# Patient Record
Sex: MALE | Race: WHITE | Employment: FULL TIME | URBAN - NONMETROPOLITAN AREA
[De-identification: names, ages, dates, MRNs, and addresses within clinical notes are randomized per-mention and may not be internally consistent; named-entity substitution may affect disease eponyms.]

---

## 2017-08-01 ENCOUNTER — APPOINTMENT (OUTPATIENT)
Dept: GENERAL RADIOLOGY | Age: 57
DRG: 808 | End: 2017-08-01
Payer: COMMERCIAL

## 2017-08-01 ENCOUNTER — HOSPITAL ENCOUNTER (INPATIENT)
Age: 57
LOS: 3 days | Discharge: HOME | DRG: 808 | End: 2017-08-04
Attending: EMERGENCY MEDICINE | Admitting: INTERNAL MEDICINE
Payer: COMMERCIAL

## 2017-08-01 DIAGNOSIS — E87.1 HYPONATREMIA: ICD-10-CM

## 2017-08-01 DIAGNOSIS — D69.6 THROMBOCYTOPENIA (HCC): ICD-10-CM

## 2017-08-01 DIAGNOSIS — D61.818 PANCYTOPENIA (HCC): Primary | ICD-10-CM

## 2017-08-01 LAB
ABO: NORMAL
ALBUMIN SERPL-MCNC: 2.5 G/DL (ref 3.5–5.1)
ALP BLD-CCNC: 195 U/L (ref 38–126)
ALT SERPL-CCNC: 50 U/L (ref 11–66)
ANION GAP SERPL CALCULATED.3IONS-SCNC: 15 MEQ/L (ref 8–16)
ANTIBODY SCREEN: NORMAL
AST SERPL-CCNC: 114 U/L (ref 5–40)
BILIRUB SERPL-MCNC: 1 MG/DL (ref 0.3–1.2)
BUN BLDV-MCNC: 21 MG/DL (ref 7–22)
CALCIUM SERPL-MCNC: 8 MG/DL (ref 8.5–10.5)
CHLORIDE BLD-SCNC: 94 MEQ/L (ref 98–111)
CO2: 19 MEQ/L (ref 23–33)
CREAT SERPL-MCNC: 0.9 MG/DL (ref 0.4–1.2)
GFR SERPL CREATININE-BSD FRML MDRD: 86 ML/MIN/1.73M2
GLUCOSE BLD-MCNC: 130 MG/DL (ref 70–108)
INR BLD: 1.15 (ref 0.85–1.13)
OSMOLALITY CALCULATION: 261.8 MOSMOL/KG (ref 275–300)
POTASSIUM SERPL-SCNC: 3.7 MEQ/L (ref 3.5–5.2)
RH FACTOR: NORMAL
SCAN OF BLOOD SMEAR: NORMAL
SODIUM BLD-SCNC: 128 MEQ/L (ref 135–145)
TOTAL PROTEIN: 8.4 G/DL (ref 6.1–8)

## 2017-08-01 PROCEDURE — 80053 COMPREHEN METABOLIC PANEL: CPT

## 2017-08-01 PROCEDURE — P9035 PLATELET PHERES LEUKOREDUCED: HCPCS

## 2017-08-01 PROCEDURE — 2580000003 HC RX 258: Performed by: EMERGENCY MEDICINE

## 2017-08-01 PROCEDURE — 1200000000 HC SEMI PRIVATE

## 2017-08-01 PROCEDURE — 85025 COMPLETE CBC W/AUTO DIFF WBC: CPT

## 2017-08-01 PROCEDURE — 36415 COLL VENOUS BLD VENIPUNCTURE: CPT

## 2017-08-01 PROCEDURE — 87040 BLOOD CULTURE FOR BACTERIA: CPT

## 2017-08-01 PROCEDURE — 85610 PROTHROMBIN TIME: CPT

## 2017-08-01 PROCEDURE — 86850 RBC ANTIBODY SCREEN: CPT

## 2017-08-01 PROCEDURE — 86901 BLOOD TYPING SEROLOGIC RH(D): CPT

## 2017-08-01 PROCEDURE — 36430 TRANSFUSION BLD/BLD COMPNT: CPT

## 2017-08-01 PROCEDURE — 99284 EMERGENCY DEPT VISIT MOD MDM: CPT

## 2017-08-01 PROCEDURE — 86900 BLOOD TYPING SEROLOGIC ABO: CPT

## 2017-08-01 PROCEDURE — 2580000003 HC RX 258: Performed by: INTERNAL MEDICINE

## 2017-08-01 PROCEDURE — 71010 XR CHEST PORTABLE: CPT

## 2017-08-01 PROCEDURE — 99222 1ST HOSP IP/OBS MODERATE 55: CPT | Performed by: INTERNAL MEDICINE

## 2017-08-01 RX ORDER — POTASSIUM CHLORIDE 20MEQ/15ML
40 LIQUID (ML) ORAL PRN
Status: DISCONTINUED | OUTPATIENT
Start: 2017-08-01 | End: 2017-08-04 | Stop reason: HOSPADM

## 2017-08-01 RX ORDER — ACETAMINOPHEN 325 MG/1
650 TABLET ORAL EVERY 4 HOURS PRN
Status: DISCONTINUED | OUTPATIENT
Start: 2017-08-01 | End: 2017-08-04 | Stop reason: HOSPADM

## 2017-08-01 RX ORDER — SODIUM CHLORIDE 0.9 % (FLUSH) 0.9 %
10 SYRINGE (ML) INJECTION EVERY 12 HOURS SCHEDULED
Status: DISCONTINUED | OUTPATIENT
Start: 2017-08-01 | End: 2017-08-04 | Stop reason: HOSPADM

## 2017-08-01 RX ORDER — 0.9 % SODIUM CHLORIDE 0.9 %
250 INTRAVENOUS SOLUTION INTRAVENOUS ONCE
Status: COMPLETED | OUTPATIENT
Start: 2017-08-01 | End: 2017-08-02

## 2017-08-01 RX ORDER — POTASSIUM CHLORIDE 20 MEQ/1
40 TABLET, EXTENDED RELEASE ORAL PRN
Status: DISCONTINUED | OUTPATIENT
Start: 2017-08-01 | End: 2017-08-04 | Stop reason: HOSPADM

## 2017-08-01 RX ORDER — SODIUM CHLORIDE 0.9 % (FLUSH) 0.9 %
10 SYRINGE (ML) INJECTION PRN
Status: DISCONTINUED | OUTPATIENT
Start: 2017-08-01 | End: 2017-08-04 | Stop reason: HOSPADM

## 2017-08-01 RX ORDER — POTASSIUM CHLORIDE 7.45 MG/ML
10 INJECTION INTRAVENOUS PRN
Status: DISCONTINUED | OUTPATIENT
Start: 2017-08-01 | End: 2017-08-04 | Stop reason: HOSPADM

## 2017-08-01 RX ORDER — 0.9 % SODIUM CHLORIDE 0.9 %
250 INTRAVENOUS SOLUTION INTRAVENOUS ONCE
Status: COMPLETED | OUTPATIENT
Start: 2017-08-01 | End: 2017-08-01

## 2017-08-01 RX ORDER — SODIUM CHLORIDE 9 MG/ML
INJECTION, SOLUTION INTRAVENOUS CONTINUOUS
Status: ACTIVE | OUTPATIENT
Start: 2017-08-02 | End: 2017-08-02

## 2017-08-01 RX ORDER — ONDANSETRON 2 MG/ML
4 INJECTION INTRAMUSCULAR; INTRAVENOUS EVERY 6 HOURS PRN
Status: DISCONTINUED | OUTPATIENT
Start: 2017-08-01 | End: 2017-08-04 | Stop reason: HOSPADM

## 2017-08-01 RX ADMIN — SODIUM CHLORIDE 250 ML: 9 INJECTION, SOLUTION INTRAVENOUS at 22:12

## 2017-08-01 RX ADMIN — SODIUM CHLORIDE: 9 INJECTION, SOLUTION INTRAVENOUS at 23:49

## 2017-08-01 ASSESSMENT — PAIN SCALES - GENERAL: PAINLEVEL_OUTOF10: 0

## 2017-08-02 PROBLEM — D61.818 PANCYTOPENIA (HCC): Status: ACTIVE | Noted: 2017-08-02

## 2017-08-02 PROBLEM — E87.1 HYPONATREMIA: Status: ACTIVE | Noted: 2017-08-02

## 2017-08-02 LAB
ANION GAP SERPL CALCULATED.3IONS-SCNC: 10 MEQ/L (ref 8–16)
ANISOCYTOSIS: ABNORMAL
ANTIBODY SCREEN: NORMAL
BASOPHILIA: SLIGHT
BASOPHILS # BLD: 1.5 %
BASOPHILS ABSOLUTE: 0 THOU/MM3 (ref 0–0.1)
BILIRUB SERPL-MCNC: 0.9 MG/DL (ref 0.3–1.2)
BILIRUBIN DIRECT: 0.4 MG/DL (ref 0–0.3)
BUN BLDV-MCNC: 18 MG/DL (ref 7–22)
CALCIUM SERPL-MCNC: 7.7 MG/DL (ref 8.5–10.5)
CHLORIDE BLD-SCNC: 98 MEQ/L (ref 98–111)
CO2: 21 MEQ/L (ref 23–33)
CREAT SERPL-MCNC: 0.9 MG/DL (ref 0.4–1.2)
DIRECT COOMBS: NORMAL
EOSINOPHIL # BLD: 3.9 %
EOSINOPHILS ABSOLUTE: 0.1 THOU/MM3 (ref 0–0.4)
GFR SERPL CREATININE-BSD FRML MDRD: 86 ML/MIN/1.73M2
GLUCOSE BLD-MCNC: 85 MG/DL (ref 70–108)
HCT VFR BLD CALC: 26 % (ref 42–52)
HCT VFR BLD CALC: 27.5 % (ref 42–52)
HEMOGLOBIN: 9 GM/DL (ref 14–18)
HEMOGLOBIN: 9.4 GM/DL (ref 14–18)
LD: 325 U/L (ref 100–190)
LYMPHOCYTES # BLD: 56.7 %
LYMPHOCYTES ABSOLUTE: 1 THOU/MM3 (ref 1–4.8)
MCH RBC QN AUTO: 30.4 PG (ref 27–31)
MCH RBC QN AUTO: 30.6 PG (ref 27–31)
MCHC RBC AUTO-ENTMCNC: 34 GM/DL (ref 33–37)
MCHC RBC AUTO-ENTMCNC: 34.5 GM/DL (ref 33–37)
MCV RBC AUTO: 88.8 FL (ref 80–94)
MCV RBC AUTO: 89.2 FL (ref 80–94)
MONOCYTES # BLD: 11.7 %
MONOCYTES ABSOLUTE: 0.2 THOU/MM3 (ref 0.4–1.3)
NUCLEATED RED BLOOD CELLS: 0 /100 WBC
PATHOLOGIST REVIEW: ABNORMAL
PDW BLD-RTO: 18 % (ref 11.5–14.5)
PDW BLD-RTO: 18.2 % (ref 11.5–14.5)
PLATELET # BLD: 28 THOU/MM3 (ref 130–400)
PLATELET # BLD: 34 THOU/MM3 (ref 130–400)
PLATELET ESTIMATE: ABNORMAL
PMV BLD AUTO: 8.7 MCM (ref 7.4–10.4)
PMV BLD AUTO: 8.9 MCM (ref 7.4–10.4)
POTASSIUM SERPL-SCNC: 3.8 MEQ/L (ref 3.5–5.2)
RBC # BLD: 2.92 MILL/MM3 (ref 4.7–6.1)
RBC # BLD: 3.08 MILL/MM3 (ref 4.7–6.1)
RBC # BLD: NORMAL 10*6/UL
SEG NEUTROPHILS: 26.2 %
SEGMENTED NEUTROPHILS ABSOLUTE COUNT: 0.4 THOU/MM3 (ref 1.8–7.7)
SODIUM BLD-SCNC: 129 MEQ/L (ref 135–145)
WBC # BLD: 1.7 THOU/MM3 (ref 4.8–10.8)
WBC # BLD: 1.8 THOU/MM3 (ref 4.8–10.8)

## 2017-08-02 PROCEDURE — 80048 BASIC METABOLIC PNL TOTAL CA: CPT

## 2017-08-02 PROCEDURE — 86850 RBC ANTIBODY SCREEN: CPT

## 2017-08-02 PROCEDURE — 36415 COLL VENOUS BLD VENIPUNCTURE: CPT

## 2017-08-02 PROCEDURE — 86880 COOMBS TEST DIRECT: CPT

## 2017-08-02 PROCEDURE — 82247 BILIRUBIN TOTAL: CPT

## 2017-08-02 PROCEDURE — 2580000003 HC RX 258: Performed by: INTERNAL MEDICINE

## 2017-08-02 PROCEDURE — 1200000000 HC SEMI PRIVATE

## 2017-08-02 PROCEDURE — 83615 LACTATE (LD) (LDH) ENZYME: CPT

## 2017-08-02 PROCEDURE — 99233 SBSQ HOSP IP/OBS HIGH 50: CPT | Performed by: FAMILY MEDICINE

## 2017-08-02 PROCEDURE — 85027 COMPLETE CBC AUTOMATED: CPT

## 2017-08-02 PROCEDURE — 6370000000 HC RX 637 (ALT 250 FOR IP): Performed by: INTERNAL MEDICINE

## 2017-08-02 PROCEDURE — 82248 BILIRUBIN DIRECT: CPT

## 2017-08-02 RX ORDER — SUCRALFATE ORAL 1 G/10ML
1 SUSPENSION ORAL EVERY 6 HOURS SCHEDULED
Status: DISCONTINUED | OUTPATIENT
Start: 2017-08-02 | End: 2017-08-04 | Stop reason: HOSPADM

## 2017-08-02 RX ORDER — PREDNISONE 20 MG/1
20 TABLET ORAL 3 TIMES DAILY
Status: DISCONTINUED | OUTPATIENT
Start: 2017-08-02 | End: 2017-08-04 | Stop reason: HOSPADM

## 2017-08-02 RX ADMIN — SODIUM CHLORIDE: 9 INJECTION, SOLUTION INTRAVENOUS at 13:01

## 2017-08-02 RX ADMIN — PREDNISONE 20 MG: 20 TABLET ORAL at 19:04

## 2017-08-02 ASSESSMENT — PAIN SCALES - GENERAL
PAINLEVEL_OUTOF10: 0

## 2017-08-03 ENCOUNTER — APPOINTMENT (OUTPATIENT)
Dept: ULTRASOUND IMAGING | Age: 57
DRG: 808 | End: 2017-08-03
Payer: COMMERCIAL

## 2017-08-03 ENCOUNTER — APPOINTMENT (OUTPATIENT)
Dept: CT IMAGING | Age: 57
DRG: 808 | End: 2017-08-03
Payer: COMMERCIAL

## 2017-08-03 LAB
ALBUMIN SERPL-MCNC: 2.4 G/DL (ref 3.5–5.1)
ALP BLD-CCNC: 207 U/L (ref 38–126)
ALT SERPL-CCNC: 48 U/L (ref 11–66)
ANION GAP SERPL CALCULATED.3IONS-SCNC: 11 MEQ/L (ref 8–16)
ANISOCYTOSIS: ABNORMAL
AST SERPL-CCNC: 115 U/L (ref 5–40)
BASOPHILIA: SLIGHT
BASOPHILS # BLD: 0.4 %
BASOPHILS ABSOLUTE: 0 THOU/MM3 (ref 0–0.1)
BILIRUB SERPL-MCNC: 0.8 MG/DL (ref 0.3–1.2)
BUN BLDV-MCNC: 16 MG/DL (ref 7–22)
CALCIUM IONIZED: 1.15 MMOL/L (ref 1.12–1.32)
CALCIUM SERPL-MCNC: 8 MG/DL (ref 8.5–10.5)
CHLORIDE BLD-SCNC: 100 MEQ/L (ref 98–111)
CO2: 21 MEQ/L (ref 23–33)
CREAT SERPL-MCNC: 0.7 MG/DL (ref 0.4–1.2)
EOSINOPHIL # BLD: 0.8 %
EOSINOPHILS ABSOLUTE: 0 THOU/MM3 (ref 0–0.4)
GFR SERPL CREATININE-BSD FRML MDRD: > 90 ML/MIN/1.73M2
GLUCOSE BLD-MCNC: 242 MG/DL (ref 70–108)
HCT VFR BLD CALC: 24.9 % (ref 42–52)
HEMOGLOBIN: 8.8 GM/DL (ref 14–18)
LYMPHOCYTES # BLD: 51.8 %
LYMPHOCYTES ABSOLUTE: 0.5 THOU/MM3 (ref 1–4.8)
MAGNESIUM: 2 MG/DL (ref 1.6–2.4)
MCH RBC QN AUTO: 31.1 PG (ref 27–31)
MCHC RBC AUTO-ENTMCNC: 35.3 GM/DL (ref 33–37)
MCV RBC AUTO: 88.3 FL (ref 80–94)
MONOCYTES # BLD: 7.9 %
MONOCYTES ABSOLUTE: 0.1 THOU/MM3 (ref 0.4–1.3)
NUCLEATED RED BLOOD CELLS: 0 /100 WBC
PDW BLD-RTO: 18.4 % (ref 11.5–14.5)
PLATELET # BLD: 23 THOU/MM3 (ref 130–400)
PLATELET ESTIMATE: ABNORMAL
PMV BLD AUTO: 9 MCM (ref 7.4–10.4)
POTASSIUM SERPL-SCNC: 4.1 MEQ/L (ref 3.5–5.2)
RBC # BLD: 2.82 MILL/MM3 (ref 4.7–6.1)
RBC # BLD: ABNORMAL 10*6/UL
SCAN OF BLOOD SMEAR: NORMAL
SEG NEUTROPHILS: 39.1 %
SEGMENTED NEUTROPHILS ABSOLUTE COUNT: 0.4 THOU/MM3 (ref 1.8–7.7)
SODIUM BLD-SCNC: 132 MEQ/L (ref 135–145)
T4 FREE: 1.06 NG/DL (ref 0.93–1.76)
TOTAL PROTEIN: 8.1 G/DL (ref 6.1–8)
TSH SERPL DL<=0.05 MIU/L-ACNC: 0.4 UIU/ML (ref 0.4–4.2)
WBC # BLD: 1 THOU/MM3 (ref 4.8–10.8)

## 2017-08-03 PROCEDURE — 6360000004 HC RX CONTRAST MEDICATION: Performed by: FAMILY MEDICINE

## 2017-08-03 PROCEDURE — 6370000000 HC RX 637 (ALT 250 FOR IP): Performed by: INTERNAL MEDICINE

## 2017-08-03 PROCEDURE — 80050 GENERAL HEALTH PANEL: CPT

## 2017-08-03 PROCEDURE — 36415 COLL VENOUS BLD VENIPUNCTURE: CPT

## 2017-08-03 PROCEDURE — 1200000000 HC SEMI PRIVATE

## 2017-08-03 PROCEDURE — 88184 FLOWCYTOMETRY/ TC 1 MARKER: CPT

## 2017-08-03 PROCEDURE — 99233 SBSQ HOSP IP/OBS HIGH 50: CPT | Performed by: FAMILY MEDICINE

## 2017-08-03 PROCEDURE — 71260 CT THORAX DX C+: CPT

## 2017-08-03 PROCEDURE — 76705 ECHO EXAM OF ABDOMEN: CPT

## 2017-08-03 PROCEDURE — 84439 ASSAY OF FREE THYROXINE: CPT

## 2017-08-03 PROCEDURE — 83735 ASSAY OF MAGNESIUM: CPT

## 2017-08-03 PROCEDURE — 2580000003 HC RX 258: Performed by: INTERNAL MEDICINE

## 2017-08-03 PROCEDURE — 88185 FLOWCYTOMETRY/TC ADD-ON: CPT

## 2017-08-03 PROCEDURE — A6198 ALGINATE DRESSING > 48 SQ IN: HCPCS

## 2017-08-03 PROCEDURE — 82330 ASSAY OF CALCIUM: CPT

## 2017-08-03 RX ADMIN — Medication 10 ML: at 20:51

## 2017-08-03 RX ADMIN — SUCRALFATE 1 G: 1 SUSPENSION ORAL at 23:15

## 2017-08-03 RX ADMIN — Medication 10 ML: at 09:22

## 2017-08-03 RX ADMIN — IOPAMIDOL 85 ML: 755 INJECTION, SOLUTION INTRAVENOUS at 16:57

## 2017-08-03 RX ADMIN — PREDNISONE 20 MG: 20 TABLET ORAL at 06:04

## 2017-08-03 RX ADMIN — SUCRALFATE 1 G: 1 SUSPENSION ORAL at 11:22

## 2017-08-03 RX ADMIN — SUCRALFATE 1 G: 1 SUSPENSION ORAL at 18:17

## 2017-08-03 RX ADMIN — PREDNISONE 20 MG: 20 TABLET ORAL at 14:26

## 2017-08-03 RX ADMIN — PREDNISONE 20 MG: 20 TABLET ORAL at 23:15

## 2017-08-03 RX ADMIN — SUCRALFATE 1 G: 1 SUSPENSION ORAL at 06:04

## 2017-08-03 ASSESSMENT — PAIN SCALES - GENERAL
PAINLEVEL_OUTOF10: 0

## 2017-08-04 VITALS
BODY MASS INDEX: 22.85 KG/M2 | HEART RATE: 67 BPM | RESPIRATION RATE: 18 BRPM | WEIGHT: 163.2 LBS | HEIGHT: 71 IN | TEMPERATURE: 97.3 F | DIASTOLIC BLOOD PRESSURE: 58 MMHG | OXYGEN SATURATION: 100 % | SYSTOLIC BLOOD PRESSURE: 104 MMHG

## 2017-08-04 LAB
ALBUMIN SERPL-MCNC: 2.6 G/DL (ref 3.5–5.1)
ALP BLD-CCNC: 213 U/L (ref 38–126)
ALT SERPL-CCNC: 49 U/L (ref 11–66)
ANION GAP SERPL CALCULATED.3IONS-SCNC: 13 MEQ/L (ref 8–16)
ANISOCYTOSIS: ABNORMAL
AST SERPL-CCNC: 100 U/L (ref 5–40)
BASOPHILIA: SLIGHT
BASOPHILS # BLD: 0 %
BASOPHILS ABSOLUTE: 0 THOU/MM3 (ref 0–0.1)
BILIRUB SERPL-MCNC: 0.7 MG/DL (ref 0.3–1.2)
BUN BLDV-MCNC: 23 MG/DL (ref 7–22)
CALCIUM SERPL-MCNC: 8.2 MG/DL (ref 8.5–10.5)
CHLORIDE BLD-SCNC: 97 MEQ/L (ref 98–111)
CO2: 21 MEQ/L (ref 23–33)
CREAT SERPL-MCNC: 0.7 MG/DL (ref 0.4–1.2)
EOSINOPHIL # BLD: 0 %
EOSINOPHILS ABSOLUTE: 0 THOU/MM3 (ref 0–0.4)
GFR SERPL CREATININE-BSD FRML MDRD: > 90 ML/MIN/1.73M2
GLUCOSE BLD-MCNC: 129 MG/DL (ref 70–108)
HCT VFR BLD CALC: 27.6 % (ref 42–52)
HEMOGLOBIN: 9.1 GM/DL (ref 14–18)
LYMPHOCYTES # BLD: 47 %
LYMPHOCYTES ABSOLUTE: 0.6 THOU/MM3 (ref 1–4.8)
MCH RBC QN AUTO: 30.3 PG (ref 27–31)
MCHC RBC AUTO-ENTMCNC: 33.1 GM/DL (ref 33–37)
MCV RBC AUTO: 91.5 FL (ref 80–94)
MONOCYTES # BLD: 13 %
MONOCYTES ABSOLUTE: 0.2 THOU/MM3 (ref 0.4–1.3)
NUCLEATED RED BLOOD CELLS: 1 /100 WBC
PDW BLD-RTO: 17.8 % (ref 11.5–14.5)
PLATELET # BLD: 36 THOU/MM3 (ref 130–400)
PLATELET ESTIMATE: ABNORMAL
PMV BLD AUTO: 9.9 MCM (ref 7.4–10.4)
POTASSIUM SERPL-SCNC: 4.6 MEQ/L (ref 3.5–5.2)
RBC # BLD: 3.01 MILL/MM3 (ref 4.7–6.1)
RBC # BLD: ABNORMAL 10*6/UL
RHEUMATOID FACTOR: 462 IU/ML (ref 0–13)
ROULEAUX: SLIGHT
SCAN OF BLOOD SMEAR: NORMAL
SEG NEUTROPHILS: 40 %
SEGMENTED NEUTROPHILS ABSOLUTE COUNT: 0.5 THOU/MM3 (ref 1.8–7.7)
SODIUM BLD-SCNC: 131 MEQ/L (ref 135–145)
TOTAL PROTEIN: 9 G/DL (ref 6.1–8)
WBC # BLD: 1.3 THOU/MM3 (ref 4.8–10.8)

## 2017-08-04 PROCEDURE — 2580000003 HC RX 258: Performed by: INTERNAL MEDICINE

## 2017-08-04 PROCEDURE — 85025 COMPLETE CBC W/AUTO DIFF WBC: CPT

## 2017-08-04 PROCEDURE — 80053 COMPREHEN METABOLIC PANEL: CPT

## 2017-08-04 PROCEDURE — 6370000000 HC RX 637 (ALT 250 FOR IP): Performed by: INTERNAL MEDICINE

## 2017-08-04 PROCEDURE — 86430 RHEUMATOID FACTOR TEST QUAL: CPT

## 2017-08-04 PROCEDURE — 86038 ANTINUCLEAR ANTIBODIES: CPT

## 2017-08-04 PROCEDURE — 36415 COLL VENOUS BLD VENIPUNCTURE: CPT

## 2017-08-04 PROCEDURE — 86039 ANTINUCLEAR ANTIBODIES (ANA): CPT

## 2017-08-04 PROCEDURE — 99239 HOSP IP/OBS DSCHRG MGMT >30: CPT | Performed by: FAMILY MEDICINE

## 2017-08-04 RX ORDER — PANTOPRAZOLE SODIUM 40 MG/1
40 TABLET, DELAYED RELEASE ORAL DAILY
Qty: 30 TABLET | Refills: 1 | Status: SHIPPED | OUTPATIENT
Start: 2017-08-04

## 2017-08-04 RX ORDER — PREDNISONE 20 MG/1
20 TABLET ORAL 3 TIMES DAILY
Qty: 90 TABLET | Refills: 0 | Status: ON HOLD | OUTPATIENT
Start: 2017-08-04 | End: 2017-09-27 | Stop reason: DRUGHIGH

## 2017-08-04 RX ADMIN — Medication 10 ML: at 08:00

## 2017-08-04 RX ADMIN — SUCRALFATE 1 G: 1 SUSPENSION ORAL at 05:38

## 2017-08-04 RX ADMIN — PREDNISONE 20 MG: 20 TABLET ORAL at 05:38

## 2017-08-04 ASSESSMENT — PAIN SCALES - GENERAL
PAINLEVEL_OUTOF10: 0

## 2017-08-05 LAB — LEUK/LYMPH PHENOTYPING WB: NORMAL

## 2017-08-06 LAB — ANA SCREEN: DETECTED

## 2017-08-07 ENCOUNTER — HOSPITAL ENCOUNTER (OUTPATIENT)
Age: 57
Discharge: HOME | End: 2017-08-07
Payer: COMMERCIAL

## 2017-08-07 LAB
ANA SCREEN, REFLEXED: > 1
ANION GAP SERPL CALCULATED.3IONS-SCNC: 13 MEQ/L (ref 8–16)
ANISOCYTOSIS: ABNORMAL
BASOPHILIA: SLIGHT
BASOPHILS # BLD: 0.4 %
BASOPHILS ABSOLUTE: 0 THOU/MM3 (ref 0–0.1)
BLOOD CULTURE, ROUTINE: NORMAL
BLOOD CULTURE, ROUTINE: NORMAL
BUN BLDV-MCNC: 29 MG/DL (ref 7–22)
CALCIUM SERPL-MCNC: 8.6 MG/DL (ref 8.5–10.5)
CHLORIDE BLD-SCNC: 99 MEQ/L (ref 98–111)
CHOLESTEROL, TOTAL: 134 MG/DL (ref 100–199)
CO2: 21 MEQ/L (ref 23–33)
CREAT SERPL-MCNC: 0.7 MG/DL (ref 0.4–1.2)
EOSINOPHIL # BLD: 0.2 %
EOSINOPHILS ABSOLUTE: 0 THOU/MM3 (ref 0–0.4)
GFR SERPL CREATININE-BSD FRML MDRD: > 90 ML/MIN/1.73M2
GLUCOSE BLD-MCNC: 109 MG/DL (ref 70–108)
HCT VFR BLD CALC: 32.6 % (ref 42–52)
HDLC SERPL-MCNC: 36 MG/DL
HEMOGLOBIN: 10.9 GM/DL (ref 14–18)
LDL CHOLESTEROL CALCULATED: 77 MG/DL
LYMPHOCYTES # BLD: 36.1 %
LYMPHOCYTES ABSOLUTE: 0.8 THOU/MM3 (ref 1–4.8)
MCH RBC QN AUTO: 29.9 PG (ref 27–31)
MCHC RBC AUTO-ENTMCNC: 33.4 GM/DL (ref 33–37)
MCV RBC AUTO: 89.4 FL (ref 80–94)
MONOCYTES # BLD: 15.6 %
MONOCYTES ABSOLUTE: 0.3 THOU/MM3 (ref 0.4–1.3)
NUCLEATED RED BLOOD CELLS: 0 /100 WBC
PDW BLD-RTO: 18 % (ref 11.5–14.5)
PLATELET # BLD: 71 THOU/MM3 (ref 130–400)
PLATELET ESTIMATE: ABNORMAL
PMV BLD AUTO: 8.2 MCM (ref 7.4–10.4)
POIKILOCYTES: SLIGHT
POTASSIUM SERPL-SCNC: 4.2 MEQ/L (ref 3.5–5.2)
PROSTATE SPECIFIC ANTIGEN: 0.31 NG/ML (ref 0–1)
RBC # BLD: 3.65 MILL/MM3 (ref 4.7–6.1)
RBC # BLD: ABNORMAL 10*6/UL
ROULEAUX: SLIGHT
SCAN OF BLOOD SMEAR: NORMAL
SEG NEUTROPHILS: 47.7 %
SEGMENTED NEUTROPHILS ABSOLUTE COUNT: 1 THOU/MM3 (ref 1.8–7.7)
SODIUM BLD-SCNC: 133 MEQ/L (ref 135–145)
TRIGL SERPL-MCNC: 105 MG/DL (ref 0–199)
WBC # BLD: 2.2 THOU/MM3 (ref 4.8–10.8)

## 2017-08-07 PROCEDURE — 80048 BASIC METABOLIC PNL TOTAL CA: CPT

## 2017-08-07 PROCEDURE — 80061 LIPID PANEL: CPT

## 2017-08-07 PROCEDURE — 36415 COLL VENOUS BLD VENIPUNCTURE: CPT

## 2017-08-07 PROCEDURE — 84153 ASSAY OF PSA TOTAL: CPT

## 2017-08-07 PROCEDURE — 85025 COMPLETE CBC W/AUTO DIFF WBC: CPT

## 2017-08-24 RX ORDER — HEPARIN SODIUM (PORCINE) LOCK FLUSH IV SOLN 100 UNIT/ML 100 UNIT/ML
500 SOLUTION INTRAVENOUS PRN
Status: CANCELLED | OUTPATIENT
Start: 2017-08-25

## 2017-08-24 RX ORDER — SODIUM CHLORIDE 0.9 % (FLUSH) 0.9 %
20 SYRINGE (ML) INJECTION PRN
Status: CANCELLED | OUTPATIENT
Start: 2017-08-25

## 2017-08-24 RX ORDER — 0.9 % SODIUM CHLORIDE 0.9 %
250 INTRAVENOUS SOLUTION INTRAVENOUS ONCE
Status: CANCELLED
Start: 2017-08-25 | End: 2017-08-25

## 2017-08-24 RX ORDER — SODIUM CHLORIDE 0.9 % (FLUSH) 0.9 %
10 SYRINGE (ML) INJECTION PRN
Status: CANCELLED | OUTPATIENT
Start: 2017-08-25

## 2017-08-25 ENCOUNTER — HOSPITAL ENCOUNTER (OUTPATIENT)
Dept: INFUSION THERAPY | Age: 57
Discharge: HOME | End: 2017-08-25
Payer: COMMERCIAL

## 2017-08-25 VITALS
OXYGEN SATURATION: 100 % | HEART RATE: 87 BPM | TEMPERATURE: 97.7 F | BODY MASS INDEX: 23.1 KG/M2 | HEIGHT: 71 IN | DIASTOLIC BLOOD PRESSURE: 78 MMHG | SYSTOLIC BLOOD PRESSURE: 126 MMHG | RESPIRATION RATE: 18 BRPM | WEIGHT: 165 LBS

## 2017-08-25 PROCEDURE — 2580000003 HC RX 258: Performed by: INTERNAL MEDICINE

## 2017-08-25 PROCEDURE — 36430 TRANSFUSION BLD/BLD COMPNT: CPT

## 2017-08-25 PROCEDURE — P9035 PLATELET PHERES LEUKOREDUCED: HCPCS

## 2017-08-25 RX ORDER — 0.9 % SODIUM CHLORIDE 0.9 %
250 INTRAVENOUS SOLUTION INTRAVENOUS ONCE
Status: CANCELLED
Start: 2017-08-25 | End: 2017-08-25

## 2017-08-25 RX ORDER — SODIUM CHLORIDE 0.9 % (FLUSH) 0.9 %
20 SYRINGE (ML) INJECTION PRN
Status: CANCELLED | OUTPATIENT
Start: 2017-08-25

## 2017-08-25 RX ORDER — HEPARIN SODIUM (PORCINE) LOCK FLUSH IV SOLN 100 UNIT/ML 100 UNIT/ML
500 SOLUTION INTRAVENOUS PRN
Status: CANCELLED | OUTPATIENT
Start: 2017-08-25

## 2017-08-25 RX ORDER — SODIUM CHLORIDE 0.9 % (FLUSH) 0.9 %
10 SYRINGE (ML) INJECTION PRN
Status: CANCELLED | OUTPATIENT
Start: 2017-08-25

## 2017-08-25 RX ORDER — 0.9 % SODIUM CHLORIDE 0.9 %
250 INTRAVENOUS SOLUTION INTRAVENOUS ONCE
Status: COMPLETED | OUTPATIENT
Start: 2017-08-25 | End: 2017-08-25

## 2017-08-25 RX ADMIN — SODIUM CHLORIDE 250 ML: 9 INJECTION, SOLUTION INTRAVENOUS at 11:35

## 2017-08-25 NOTE — IP AVS SNAPSHOT
After Visit Summary  (Discharge Instructions)    Medication List for Home    Based on the information you provided to us as well as any changes during this visit, the following is your updated medication list.  Compare this with your prescription bottles at home. If you have any questions or concerns, contact your primary care physician's office. Daily Medication List (This medication list can be shared with any healthcare provider who is helping you manage your medications)      ASK your doctor about these medications if you have questions        Last Dose    Next Dose Due AM NOON PM NIGHT    pantoprazole 40 MG tablet   Commonly known as:  PROTONIX   Take 1 tablet by mouth daily                                         predniSONE 20 MG tablet   Commonly known as:  DELTASONE   Take 1 tablet by mouth 3 times daily                                                 Allergies as of 8/25/2017     No Known Allergies      Immunizations as of 8/25/2017     No immunizations on file. Last Vitals          Most Recent Value    Temp  97.6 °F (36.4 °C)    Pulse  73    Resp  18 [lungs clear]    BP  117/71         After Visit Summary    This summary was created for you. Thank you for entrusting your care to us. The following information includes details about your hospital/visit stay along with steps you should take to help with your recovery once you leave the hospital.  In this packet, you will find information about the topics listed below:    · Instructions about your medications including a list of your home medications  · A summary of your hospital visit  · Follow-up appointments once you have left the hospital  · Your care plan at home      You may receive a survey regarding the care you received during your stay. Your input is valuable to us. We encourage you to complete and return your survey in the envelope provided. We hope you will choose us in the future for your healthcare needs. Patient Information     Patient Name FATIMAH Joseph 1960      Care Provided at:     Name Address Phone       [address] [phone]            Your Visit    Here you will find information about your visit, including the reason for your visit. Please take this sheet with you when you visit your doctor or other health care provider in the future. It will help determine the best possible medical care for you at that time. If you have any questions once you leave the hospital, please call the department phone number listed below. Why you were here     Your primary diagnosis was:  Not on File      Visit Information     Date & Time Department Dept. Phone    2017 [facility]  ONCOLOGY [phone]       Follow-up Appointments    Below is a list of your follow-up and future appointments. This may not be a complete list as you may have made appointments directly with providers that we are not aware of or your providers may have made some for you. Please call your providers to confirm appointments. It is important to keep your appointments. Please bring your current insurance card, photo ID, co-pay, and all medication bottles to your appointment. If self-pay, payment is expected at the time of service. Preventive Care        Date Due    Hepatitis C screening is recommended for all adults regardless of risk factors born between [facility] at least once (lifetime) who have never been tested. 1960    HIV screening is recommended for all people regardless of risk factors  aged 15-65 years at least once (lifetime) who have never been HIV tested.  1973    Tetanus Combination Vaccine (1 - Tdap) 1977    Pneumococcal Vaccine - Pneumovax for adults aged 19-64 years with: chronic heart disease, chronic lung disease, diabetes mellitus, alcoholism, chronic liver disease, or cigarette smoking. (1 of 1 - PPSV23) 8/5/1977    Colonoscopy 8/5/2008    Yearly Flu Vaccine (1) 8/1/2017    Cholesterol Screening 8/7/2022                 Care Plan Once You Return Home    This section includes instructions you will need to follow once you leave the hospital.  Your care team will discuss these with you, so you and those caring for you know how to best care for your health needs at home. This section may also include educational information about certain health topics that may be of help to you. Discharge Instructions       Please contact your Oncologist if you have any questions regarding the platelet transfusion that you received today. Patient instructed if experience any of the symptoms following today's platelet transfusion / to notify MD immediately or go to emergency department. * dizziness/lightheadedness  *acute nausea/vomiting - not relieved with medication  *headache - not relieved from Tylenol/pain medication  *chest pain/pressure  *rash/itching  *shortness of breath        Drink fluids - 48oz fluids daily  Call if develop fever/ chills/ signs or symptoms of infection    Patient to follow up with Dr Basilia Live on Monday. Important information for a smoker       SMOKING: QUIT SMOKING. THIS IS THE MOST IMPORTANT ACTION YOU CAN TAKE TO IMPROVE YOUR CURRENT AND FUTURE HEALTH. Call the [address] [place] [address] at [place] NOW ([phone])    Smoking harms nonsmokers. When nonsmokers are around people who smoke, they absorb nicotine, carbon monoxide, and other ingredients of tobacco smoke. DO NOT SMOKE AROUND CHILDREN     Children exposed to secondhand smoke are at an increased risk of:  Sudden Infant Death Syndrome (SIDS), acute respiratory infections, inflammation of the middle ear, and severe asthma. Over a longer time, it causes heart disease and lung cancer. There is no safe level of exposure to secondhand smoke.                 [facility] Our records indicate that you have an active [employer] account. You can view your After Visit Summary by going to [email or web address] and logging in with your [employer] username and password. If you don't have a [employer] username and password but a parent or guardian has access to your record, the parent or guardian should login with their own [employer] username and password and access your record to view the After Visit Summary. Additional Information  If you have questions, please contact the physician practice where you receive care. Remember, [employer] is NOT to be used for urgent needs. For medical emergencies, dial 911. For questions regarding your [employer] account call [phone]. If you have a clinical question, please call your doctor's office. View your information online  ? Review your current list of  medications, immunization, and allergies. ? Review your future test results online . ? Review your discharge instructions provided by your caregivers at discharge    Certain functionality such as prescription refills, scheduling appointments or sending messages to your provider are not activated if your provider does not use [employer] in his/her office    For questions regarding your [employer] account call [phone]. If you have a clinical question, please call your doctor's office. The information on all pages of the After Visit Summary has been reviewed with me, the patient and/or responsible adult, by my health care provider(s). I had the opportunity to ask questions regarding this information. I understand I should dispose of my armband safely at home to protect my health information. A complete copy of the After Visit Summary has been given to me, the patient and/or responsible adult.            Patient Signature/Responsible Adult:____________________    Clinician Signature:_____________________    Date:_____________________ Time:_____________________

## 2017-08-25 NOTE — PLAN OF CARE
Problem: Intellectual/Education/Knowledge Deficit  Intervention: Verbal/written education provided  Patient educated blood product transfusion protocol:     Patient receiving 8units platelets:        - Blood product transfusion information sheet given: questions answered and consent signed  - Take vital signs/ monitor lungs sound prior to transfusion  - Monitor patient for 15 minutes after transfusion started  - Take vital signs / monitor lungs sound in 15 minutes and post transfusion  - Assess IV site   - Monitor patient closely for potential transfusion reaction     Call MD if develop complications once discharged. Goal: Teaching initiated upon admission  Outcome: Met This Shift  Patient verbalizes understanding to verbal information given on platelet transfusion,action and possible side effects. Aware to call MD if develop complications. Problem: Discharge Planning  Intervention: Interaction with patient/family and care team  Provide discharge instructions. Goal: Knowledge of discharge instructions  Knowledge of discharge instructions  Outcome: Met This Shift  Verbalized understanding of discharge instructions, follow-up appointments, and when to call the physician. Problem: Bleeding:  Intervention: Bleeding precautions  Discussed bleeding precautions and instructed patient to call the physician if having any complications. Goal: Will show no signs and symptoms of excessive bleeding  Will show no signs and symptoms of excessive bleeding  Outcome: Met This Shift  No active bleeding noted. Comments:   Care plan reviewed with patient and spouse. Patient and spouse verbalize understanding of the plan of care and contribute to goal setting.

## 2017-08-25 NOTE — PROGRESS NOTES
Patient assessed for the following post platelet transfusion:    Dizziness   No  Lightheadedness  No      Acute nausea/vomiting No  Headache              No  Chest pain/pressure  No  Rash/itching   No  Shortness of breath  No    Patient kept for 20 minutes observation post platelet transfusion. Patient tolerated 8unit platelet transfusion without any complications. Last vital signs:   /78  Pulse 87  Temp 97.7 °F (36.5 °C) (Oral)   Resp 18 Comment: lungs clear  Ht 5' 11\" (1.803 m)  Wt 165 lb (74.8 kg)  SpO2 100%  BMI 23.01 kg/m2    Patient instructed if experience any of the above symptoms following today's transfusion,he/she is to notify MD immediately or go to the emergency department. Discharge instructions given to patient. Verbalizes understanding. Ambulated off unit per self with spouse with belongings.

## 2017-08-25 NOTE — IP AVS SNAPSHOT
Patient Information     Patient Name FATIMAH Marshall 1960         This is your updated medication list to keep with you all times      ASK your doctor about these medications     pantoprazole 40 MG tablet   Commonly known as:  PROTONIX   Take 1 tablet by mouth daily       predniSONE 20 MG tablet   Commonly known as:  DELTASONE   Take 1 tablet by mouth 3 times daily

## 2017-09-27 ENCOUNTER — HOSPITAL ENCOUNTER (INPATIENT)
Age: 57
LOS: 1 days | Discharge: HOME | DRG: 812 | End: 2017-09-28
Attending: EMERGENCY MEDICINE | Admitting: ANESTHESIOLOGY
Payer: COMMERCIAL

## 2017-09-27 ENCOUNTER — APPOINTMENT (OUTPATIENT)
Dept: CT IMAGING | Age: 57
DRG: 812 | End: 2017-09-27
Payer: COMMERCIAL

## 2017-09-27 DIAGNOSIS — C94.6 MYELODYSPLASTIC DISEASE (HCC): ICD-10-CM

## 2017-09-27 DIAGNOSIS — D64.9 ANEMIA, UNSPECIFIED TYPE: ICD-10-CM

## 2017-09-27 DIAGNOSIS — D69.6 THROMBOCYTOPENIA (HCC): ICD-10-CM

## 2017-09-27 DIAGNOSIS — R07.9 CHEST PAIN, UNSPECIFIED TYPE: Primary | ICD-10-CM

## 2017-09-27 DIAGNOSIS — R77.8 ELEVATED TROPONIN: ICD-10-CM

## 2017-09-27 PROBLEM — I21.4 NSTEMI (NON-ST ELEVATED MYOCARDIAL INFARCTION) (HCC): Status: ACTIVE | Noted: 2017-09-27

## 2017-09-27 LAB
ABO: NORMAL
ALBUMIN SERPL-MCNC: 3.4 G/DL (ref 3.5–5.1)
ALP BLD-CCNC: 127 U/L (ref 38–126)
ALT SERPL-CCNC: 41 U/L (ref 11–66)
AMPHETAMINE+METHAMPHETAMINE URINE SCREEN: NEGATIVE
ANION GAP SERPL CALCULATED.3IONS-SCNC: 16 MEQ/L (ref 8–16)
ANISOCYTOSIS: ABNORMAL
ANTIBODY SCREEN: NORMAL
APTT: 31.7 SECONDS (ref 22–38)
APTT: 33 SECONDS (ref 22–38)
APTT: 34.4 SECONDS (ref 22–38)
AST SERPL-CCNC: 75 U/L (ref 5–40)
BARBITURATE QUANTITATIVE URINE: NEGATIVE
BASOPHILS # BLD: 0.7 %
BASOPHILS ABSOLUTE: 0 THOU/MM3 (ref 0–0.1)
BENZODIAZEPINE QUANTITATIVE URINE: NEGATIVE
BILIRUB SERPL-MCNC: 1.7 MG/DL (ref 0.3–1.2)
BILIRUBIN DIRECT: 0.5 MG/DL (ref 0–0.3)
BILIRUBIN URINE: NEGATIVE
BLOOD, URINE: NEGATIVE
BUN BLDV-MCNC: 20 MG/DL (ref 7–22)
CALCIUM SERPL-MCNC: 9.1 MG/DL (ref 8.5–10.5)
CANNABINOID QUANTITATIVE URINE: NEGATIVE
CHARACTER, URINE: CLEAR
CHLORIDE BLD-SCNC: 95 MEQ/L (ref 98–111)
CO2: 21 MEQ/L (ref 23–33)
COCAINE METABOLITE QUANTITATIVE URINE: NEGATIVE
COLOR: YELLOW
CREAT SERPL-MCNC: 1 MG/DL (ref 0.4–1.2)
EKG ATRIAL RATE: 108 BPM
EKG P AXIS: 79 DEGREES
EKG P-R INTERVAL: 128 MS
EKG Q-T INTERVAL: 330 MS
EKG QRS DURATION: 94 MS
EKG QTC CALCULATION (BAZETT): 442 MS
EKG R AXIS: 9 DEGREES
EKG T AXIS: 70 DEGREES
EKG VENTRICULAR RATE: 108 BPM
EOSINOPHIL # BLD: 0.8 %
EOSINOPHILS ABSOLUTE: 0 THOU/MM3 (ref 0–0.4)
ETHYL ALCOHOL, SERUM: < 0.01 %
GFR SERPL CREATININE-BSD FRML MDRD: 76 ML/MIN/1.73M2
GLUCOSE BLD-MCNC: 78 MG/DL (ref 70–108)
GLUCOSE URINE: NEGATIVE MG/DL
HCT VFR BLD CALC: 26.8 % (ref 42–52)
HCT VFR BLD CALC: 29.8 % (ref 42–52)
HEMOGLOBIN: 10.5 GM/DL (ref 14–18)
HEMOGLOBIN: 9.2 GM/DL (ref 14–18)
INR BLD: 1.03 (ref 0.85–1.13)
KETONES, URINE: NEGATIVE
LEUKOCYTE ESTERASE, URINE: NEGATIVE
LIPASE: 102.6 U/L (ref 5.6–51.3)
LYMPHOCYTES # BLD: 31.4 %
LYMPHOCYTES ABSOLUTE: 1.4 THOU/MM3 (ref 1–4.8)
MAGNESIUM: 1.6 MG/DL (ref 1.6–2.4)
MCH RBC QN AUTO: 31.4 PG (ref 27–31)
MCH RBC QN AUTO: 32.5 PG (ref 27–31)
MCHC RBC AUTO-ENTMCNC: 34.2 GM/DL (ref 33–37)
MCHC RBC AUTO-ENTMCNC: 35.1 GM/DL (ref 33–37)
MCV RBC AUTO: 91.7 FL (ref 80–94)
MCV RBC AUTO: 92.7 FL (ref 80–94)
MONOCYTES # BLD: 8 %
MONOCYTES ABSOLUTE: 0.3 THOU/MM3 (ref 0.4–1.3)
NITRITE, URINE: NEGATIVE
NUCLEATED RED BLOOD CELLS: 0 /100 WBC
OPIATES, URINE: NEGATIVE
OSMOLALITY CALCULATION: 266 MOSMOL/KG (ref 275–300)
OXYCODONE: NEGATIVE
PDW BLD-RTO: 18.8 % (ref 11.5–14.5)
PDW BLD-RTO: 19.5 % (ref 11.5–14.5)
PH UA: 7
PHENCYCLIDINE QUANTITATIVE URINE: NEGATIVE
PLATELET # BLD: 40 THOU/MM3 (ref 130–400)
PLATELET # BLD: 44 THOU/MM3 (ref 130–400)
PMV BLD AUTO: 7.9 MCM (ref 7.4–10.4)
PMV BLD AUTO: 8.4 MCM (ref 7.4–10.4)
POTASSIUM SERPL-SCNC: 4.1 MEQ/L (ref 3.5–5.2)
PRO-BNP: 120.3 PG/ML (ref 0–900)
PROTEIN UA: NEGATIVE
RBC # BLD: 2.92 MILL/MM3 (ref 4.7–6.1)
RBC # BLD: 3.22 MILL/MM3 (ref 4.7–6.1)
RBC # BLD: NORMAL 10*6/UL
RH FACTOR: NORMAL
SEG NEUTROPHILS: 59.1 %
SEGMENTED NEUTROPHILS ABSOLUTE COUNT: 2.5 THOU/MM3 (ref 1.8–7.7)
SODIUM BLD-SCNC: 132 MEQ/L (ref 135–145)
SPECIFIC GRAVITY, URINE: 1.01 (ref 1–1.03)
TOTAL PROTEIN: 7.7 G/DL (ref 6.1–8)
TROPONIN T: 0.04 NG/ML
TROPONIN T: 0.05 NG/ML
TROPONIN T: 0.05 NG/ML
TROPONIN T: 0.07 NG/ML
TROPONIN T: 0.07 NG/ML
TSH SERPL DL<=0.05 MIU/L-ACNC: 1.14 UIU/ML (ref 0.4–4.2)
UROBILINOGEN, URINE: 0.2 EU/DL
WBC # BLD: 3.9 THOU/MM3 (ref 4.8–10.8)
WBC # BLD: 4.3 THOU/MM3 (ref 4.8–10.8)

## 2017-09-27 PROCEDURE — 6360000002 HC RX W HCPCS: Performed by: EMERGENCY MEDICINE

## 2017-09-27 PROCEDURE — 86901 BLOOD TYPING SEROLOGIC RH(D): CPT

## 2017-09-27 PROCEDURE — 81003 URINALYSIS AUTO W/O SCOPE: CPT

## 2017-09-27 PROCEDURE — 83880 ASSAY OF NATRIURETIC PEPTIDE: CPT

## 2017-09-27 PROCEDURE — 84443 ASSAY THYROID STIM HORMONE: CPT

## 2017-09-27 PROCEDURE — 80307 DRUG TEST PRSMV CHEM ANLYZR: CPT

## 2017-09-27 PROCEDURE — 83735 ASSAY OF MAGNESIUM: CPT

## 2017-09-27 PROCEDURE — 74177 CT ABD & PELVIS W/CONTRAST: CPT

## 2017-09-27 PROCEDURE — 85610 PROTHROMBIN TIME: CPT

## 2017-09-27 PROCEDURE — 36415 COLL VENOUS BLD VENIPUNCTURE: CPT

## 2017-09-27 PROCEDURE — 71275 CT ANGIOGRAPHY CHEST: CPT

## 2017-09-27 PROCEDURE — 2580000003 HC RX 258: Performed by: ANESTHESIOLOGY

## 2017-09-27 PROCEDURE — 80053 COMPREHEN METABOLIC PANEL: CPT

## 2017-09-27 PROCEDURE — 85027 COMPLETE CBC AUTOMATED: CPT

## 2017-09-27 PROCEDURE — 6370000000 HC RX 637 (ALT 250 FOR IP): Performed by: ANESTHESIOLOGY

## 2017-09-27 PROCEDURE — 83690 ASSAY OF LIPASE: CPT

## 2017-09-27 PROCEDURE — 6360000002 HC RX W HCPCS: Performed by: ANESTHESIOLOGY

## 2017-09-27 PROCEDURE — 6360000004 HC RX CONTRAST MEDICATION: Performed by: EMERGENCY MEDICINE

## 2017-09-27 PROCEDURE — 99285 EMERGENCY DEPT VISIT HI MDM: CPT

## 2017-09-27 PROCEDURE — 96374 THER/PROPH/DIAG INJ IV PUSH: CPT

## 2017-09-27 PROCEDURE — 2140000000 HC CCU INTERMEDIATE R&B

## 2017-09-27 PROCEDURE — P9035 PLATELET PHERES LEUKOREDUCED: HCPCS

## 2017-09-27 PROCEDURE — 36430 TRANSFUSION BLD/BLD COMPNT: CPT

## 2017-09-27 PROCEDURE — 2580000003 HC RX 258: Performed by: INTERNAL MEDICINE

## 2017-09-27 PROCEDURE — 86900 BLOOD TYPING SEROLOGIC ABO: CPT

## 2017-09-27 PROCEDURE — B32T1ZZ COMPUTERIZED TOMOGRAPHY (CT SCAN) OF LEFT PULMONARY ARTERY USING LOW OSMOLAR CONTRAST: ICD-10-PCS | Performed by: RADIOLOGY

## 2017-09-27 PROCEDURE — B32S1ZZ COMPUTERIZED TOMOGRAPHY (CT SCAN) OF RIGHT PULMONARY ARTERY USING LOW OSMOLAR CONTRAST: ICD-10-PCS | Performed by: RADIOLOGY

## 2017-09-27 PROCEDURE — 96361 HYDRATE IV INFUSION ADD-ON: CPT

## 2017-09-27 PROCEDURE — G0480 DRUG TEST DEF 1-7 CLASSES: HCPCS

## 2017-09-27 PROCEDURE — 93005 ELECTROCARDIOGRAM TRACING: CPT | Performed by: EMERGENCY MEDICINE

## 2017-09-27 PROCEDURE — 99222 1ST HOSP IP/OBS MODERATE 55: CPT | Performed by: INTERNAL MEDICINE

## 2017-09-27 PROCEDURE — 86850 RBC ANTIBODY SCREEN: CPT

## 2017-09-27 PROCEDURE — 85025 COMPLETE CBC W/AUTO DIFF WBC: CPT

## 2017-09-27 PROCEDURE — 2580000003 HC RX 258: Performed by: EMERGENCY MEDICINE

## 2017-09-27 PROCEDURE — 6370000000 HC RX 637 (ALT 250 FOR IP): Performed by: EMERGENCY MEDICINE

## 2017-09-27 PROCEDURE — B22610Z COMPUTERIZED TOMOGRAPHY (CT SCAN) OF RIGHT AND LEFT HEART USING LOW OSMOLAR CONTRAST, UNENHANCED AND ENHANCED: ICD-10-PCS | Performed by: RADIOLOGY

## 2017-09-27 PROCEDURE — 82248 BILIRUBIN DIRECT: CPT

## 2017-09-27 PROCEDURE — 84484 ASSAY OF TROPONIN QUANT: CPT

## 2017-09-27 PROCEDURE — 99223 1ST HOSP IP/OBS HIGH 75: CPT | Performed by: ANESTHESIOLOGY

## 2017-09-27 PROCEDURE — 85730 THROMBOPLASTIN TIME PARTIAL: CPT

## 2017-09-27 RX ORDER — NITROGLYCERIN 20 MG/100ML
5 INJECTION INTRAVENOUS CONTINUOUS
Status: DISCONTINUED | OUTPATIENT
Start: 2017-09-27 | End: 2017-09-27 | Stop reason: ALTCHOICE

## 2017-09-27 RX ORDER — HEPARIN SODIUM 1000 [USP'U]/ML
4000 INJECTION, SOLUTION INTRAVENOUS; SUBCUTANEOUS PRN
Status: DISCONTINUED | OUTPATIENT
Start: 2017-09-27 | End: 2017-09-27

## 2017-09-27 RX ORDER — MORPHINE SULFATE 2 MG/ML
4 INJECTION, SOLUTION INTRAMUSCULAR; INTRAVENOUS
Status: DISCONTINUED | OUTPATIENT
Start: 2017-09-27 | End: 2017-09-28 | Stop reason: HOSPADM

## 2017-09-27 RX ORDER — NITROGLYCERIN 0.4 MG/1
0.4 TABLET SUBLINGUAL EVERY 5 MIN PRN
Status: DISCONTINUED | OUTPATIENT
Start: 2017-09-27 | End: 2017-09-28 | Stop reason: HOSPADM

## 2017-09-27 RX ORDER — 0.9 % SODIUM CHLORIDE 0.9 %
250 INTRAVENOUS SOLUTION INTRAVENOUS ONCE
Status: COMPLETED | OUTPATIENT
Start: 2017-09-27 | End: 2017-09-27

## 2017-09-27 RX ORDER — 0.9 % SODIUM CHLORIDE 0.9 %
1000 INTRAVENOUS SOLUTION INTRAVENOUS ONCE
Status: COMPLETED | OUTPATIENT
Start: 2017-09-27 | End: 2017-09-27

## 2017-09-27 RX ORDER — SODIUM CHLORIDE 0.9 % (FLUSH) 0.9 %
10 SYRINGE (ML) INJECTION EVERY 12 HOURS SCHEDULED
Status: DISCONTINUED | OUTPATIENT
Start: 2017-09-27 | End: 2017-09-28 | Stop reason: HOSPADM

## 2017-09-27 RX ORDER — ACETAMINOPHEN 325 MG/1
650 TABLET ORAL ONCE
Status: COMPLETED | OUTPATIENT
Start: 2017-09-27 | End: 2017-09-27

## 2017-09-27 RX ORDER — MORPHINE SULFATE 2 MG/ML
2 INJECTION, SOLUTION INTRAMUSCULAR; INTRAVENOUS
Status: DISCONTINUED | OUTPATIENT
Start: 2017-09-27 | End: 2017-09-28 | Stop reason: HOSPADM

## 2017-09-27 RX ORDER — PREDNISONE 20 MG/1
20 TABLET ORAL 3 TIMES DAILY
Status: DISCONTINUED | OUTPATIENT
Start: 2017-09-27 | End: 2017-09-27

## 2017-09-27 RX ORDER — HEPARIN SODIUM 1000 [USP'U]/ML
2000 INJECTION, SOLUTION INTRAVENOUS; SUBCUTANEOUS PRN
Status: DISCONTINUED | OUTPATIENT
Start: 2017-09-27 | End: 2017-09-27 | Stop reason: ALTCHOICE

## 2017-09-27 RX ORDER — ONDANSETRON 2 MG/ML
4 INJECTION INTRAMUSCULAR; INTRAVENOUS EVERY 6 HOURS PRN
Status: DISCONTINUED | OUTPATIENT
Start: 2017-09-27 | End: 2017-09-28 | Stop reason: HOSPADM

## 2017-09-27 RX ORDER — HEPARIN SODIUM 10000 [USP'U]/100ML
12 INJECTION, SOLUTION INTRAVENOUS CONTINUOUS
Status: DISCONTINUED | OUTPATIENT
Start: 2017-09-27 | End: 2017-09-27

## 2017-09-27 RX ORDER — HEPARIN SODIUM 1000 [USP'U]/ML
4000 INJECTION, SOLUTION INTRAVENOUS; SUBCUTANEOUS ONCE
Status: DISCONTINUED | OUTPATIENT
Start: 2017-09-27 | End: 2017-09-27

## 2017-09-27 RX ORDER — HEPARIN SODIUM 1000 [USP'U]/ML
2000 INJECTION, SOLUTION INTRAVENOUS; SUBCUTANEOUS PRN
Status: DISCONTINUED | OUTPATIENT
Start: 2017-09-27 | End: 2017-09-27

## 2017-09-27 RX ORDER — HEPARIN SODIUM 10000 [USP'U]/100ML
12 INJECTION, SOLUTION INTRAVENOUS CONTINUOUS
Status: DISCONTINUED | OUTPATIENT
Start: 2017-09-27 | End: 2017-09-27 | Stop reason: ALTCHOICE

## 2017-09-27 RX ORDER — PREDNISONE 20 MG/1
20 TABLET ORAL DAILY
COMMUNITY

## 2017-09-27 RX ORDER — HYDROCODONE BITARTRATE AND ACETAMINOPHEN 5; 325 MG/1; MG/1
1 TABLET ORAL EVERY 4 HOURS PRN
Status: DISCONTINUED | OUTPATIENT
Start: 2017-09-27 | End: 2017-09-28 | Stop reason: HOSPADM

## 2017-09-27 RX ORDER — HEPARIN SODIUM 1000 [USP'U]/ML
4000 INJECTION, SOLUTION INTRAVENOUS; SUBCUTANEOUS PRN
Status: DISCONTINUED | OUTPATIENT
Start: 2017-09-27 | End: 2017-09-27 | Stop reason: ALTCHOICE

## 2017-09-27 RX ORDER — PANTOPRAZOLE SODIUM 40 MG/1
40 TABLET, DELAYED RELEASE ORAL DAILY
Status: DISCONTINUED | OUTPATIENT
Start: 2017-09-27 | End: 2017-09-28 | Stop reason: HOSPADM

## 2017-09-27 RX ORDER — PREDNISONE 20 MG/1
20 TABLET ORAL DAILY
Status: DISCONTINUED | OUTPATIENT
Start: 2017-09-28 | End: 2017-09-28 | Stop reason: HOSPADM

## 2017-09-27 RX ORDER — HYDROCODONE BITARTRATE AND ACETAMINOPHEN 5; 325 MG/1; MG/1
2 TABLET ORAL EVERY 4 HOURS PRN
Status: DISCONTINUED | OUTPATIENT
Start: 2017-09-27 | End: 2017-09-28 | Stop reason: HOSPADM

## 2017-09-27 RX ORDER — SODIUM CHLORIDE 0.9 % (FLUSH) 0.9 %
10 SYRINGE (ML) INJECTION PRN
Status: DISCONTINUED | OUTPATIENT
Start: 2017-09-27 | End: 2017-09-28 | Stop reason: HOSPADM

## 2017-09-27 RX ORDER — ATORVASTATIN CALCIUM 40 MG/1
40 TABLET, FILM COATED ORAL NIGHTLY
Status: DISCONTINUED | OUTPATIENT
Start: 2017-09-27 | End: 2017-09-28 | Stop reason: HOSPADM

## 2017-09-27 RX ORDER — ACETAMINOPHEN 325 MG/1
650 TABLET ORAL EVERY 4 HOURS PRN
Status: DISCONTINUED | OUTPATIENT
Start: 2017-09-27 | End: 2017-09-28 | Stop reason: HOSPADM

## 2017-09-27 RX ADMIN — IOPAMIDOL 80 ML: 755 INJECTION, SOLUTION INTRAVENOUS at 02:59

## 2017-09-27 RX ADMIN — HYDROCODONE BITARTRATE AND ACETAMINOPHEN 2 TABLET: 5; 325 TABLET ORAL at 12:15

## 2017-09-27 RX ADMIN — ACETAMINOPHEN 650 MG: 325 TABLET ORAL at 02:20

## 2017-09-27 RX ADMIN — HYDROMORPHONE HYDROCHLORIDE 1 MG: 1 INJECTION, SOLUTION INTRAMUSCULAR; INTRAVENOUS; SUBCUTANEOUS at 04:56

## 2017-09-27 RX ADMIN — ATORVASTATIN CALCIUM 40 MG: 40 TABLET, FILM COATED ORAL at 20:20

## 2017-09-27 RX ADMIN — SODIUM CHLORIDE 250 ML: 9 INJECTION, SOLUTION INTRAVENOUS at 08:38

## 2017-09-27 RX ADMIN — PREDNISONE 20 MG: 20 TABLET ORAL at 09:00

## 2017-09-27 RX ADMIN — MAGNESIUM SULFATE HEPTAHYDRATE 2 G: 500 INJECTION, SOLUTION INTRAMUSCULAR; INTRAVENOUS at 10:17

## 2017-09-27 RX ADMIN — Medication 10 ML: at 20:21

## 2017-09-27 RX ADMIN — METOPROLOL TARTRATE 25 MG: 25 TABLET ORAL at 20:21

## 2017-09-27 RX ADMIN — HYDROCODONE BITARTRATE AND ACETAMINOPHEN 1 TABLET: 5; 325 TABLET ORAL at 08:22

## 2017-09-27 RX ADMIN — HYDROCODONE BITARTRATE AND ACETAMINOPHEN 1 TABLET: 5; 325 TABLET ORAL at 08:30

## 2017-09-27 RX ADMIN — METOPROLOL TARTRATE 25 MG: 25 TABLET ORAL at 09:00

## 2017-09-27 RX ADMIN — Medication 10 ML: at 08:38

## 2017-09-27 RX ADMIN — SODIUM CHLORIDE 1000 ML: 9 INJECTION, SOLUTION INTRAVENOUS at 05:05

## 2017-09-27 RX ADMIN — SODIUM CHLORIDE 1000 ML: 9 INJECTION, SOLUTION INTRAVENOUS at 02:16

## 2017-09-27 RX ADMIN — HYDROCODONE BITARTRATE AND ACETAMINOPHEN 2 TABLET: 5; 325 TABLET ORAL at 20:27

## 2017-09-27 RX ADMIN — PANTOPRAZOLE SODIUM 40 MG: 40 TABLET, DELAYED RELEASE ORAL at 09:00

## 2017-09-27 ASSESSMENT — ENCOUNTER SYMPTOMS
WHEEZING: 0
SORE THROAT: 0
ABDOMINAL PAIN: 0
VOICE CHANGE: 0
EYE REDNESS: 0
CONSTIPATION: 0
DIARRHEA: 0
EYE PAIN: 0
SINUS PRESSURE: 0
ABDOMINAL DISTENTION: 0
PHOTOPHOBIA: 0
CHOKING: 0
EYE DISCHARGE: 0
BLOOD IN STOOL: 0
SHORTNESS OF BREATH: 1
TROUBLE SWALLOWING: 0
BACK PAIN: 0
NAUSEA: 0
VOMITING: 0
CHEST TIGHTNESS: 0
RHINORRHEA: 0
EYE ITCHING: 0
COUGH: 0

## 2017-09-27 ASSESSMENT — PAIN SCALES - GENERAL
PAINLEVEL_OUTOF10: 7
PAINLEVEL_OUTOF10: 0
PAINLEVEL_OUTOF10: 7
PAINLEVEL_OUTOF10: 6
PAINLEVEL_OUTOF10: 6
PAINLEVEL_OUTOF10: 0
PAINLEVEL_OUTOF10: 0
PAINLEVEL_OUTOF10: 4
PAINLEVEL_OUTOF10: 4
PAINLEVEL_OUTOF10: 8
PAINLEVEL_OUTOF10: 10

## 2017-09-27 ASSESSMENT — PAIN DESCRIPTION - DESCRIPTORS: DESCRIPTORS: SHARP

## 2017-09-27 ASSESSMENT — PAIN DESCRIPTION - ORIENTATION
ORIENTATION: RIGHT;LEFT

## 2017-09-27 ASSESSMENT — PAIN DESCRIPTION - PAIN TYPE
TYPE: ACUTE PAIN

## 2017-09-27 ASSESSMENT — PAIN DESCRIPTION - FREQUENCY: FREQUENCY: CONTINUOUS

## 2017-09-27 ASSESSMENT — PAIN DESCRIPTION - LOCATION
LOCATION: FOOT

## 2017-09-27 ASSESSMENT — PAIN DESCRIPTION - ONSET: ONSET: ON-GOING

## 2017-09-27 NOTE — CONSULTS
[address] [place] [address]                                 CONSULTATION    PATIENT NAME: Nai Ortiz                     :             1960  MED REC NO:   [id number]                            ROOM:            0034  ACCOUNT NO:   [de-identified]                            ADMISSION DATE:  2017  PROVIDER:    Alysia Fleming. Dwayne Mcpherson D.O.    CONSULT DATE:  2017    REASON FOR CONSULTATION:  Myelodysplastic syndrome. HISTORY OF PRESENT ILLNESS:  The patient is a 61-year-old white male,  who presented to the ER with the complaint of abdominal bruising on  the abdominal wall along with petechiae profoundly at the lower  extremities, dyspnea and chest pain. He stated that he has a history  of MDS. I did a bone marrow biopsy on him on 2017 that showed a  low-grade MDS with no evidence of any amyloidosis or plasma cell  neoplasm. He originally was sent to me BECAUSE OF A MONOCLONAL  GAMMOPATHY, WHICH IS NOT FROM MYELOMA. The patient is not on any  blood thinner medications because he has pancytopenia. His platelet  count was 03,022. He is on steroid three times a day and his  platelets were up to 40,000 at this present time, but he still has  problems with bruising of the lower extremities and from walking  around at work and his legs were painful. He states that he gets  dyspneic with movement and has some hoarseness of his voice, which I  did not appreciate today. He was also apparently experiencing chest  pain and he had some elevation of his troponins when he went into the  ER. A heparin drip and aspirin was entertained, but I did not wish to  proceed with that because of his thrombocytopenia and actual complaint  of excessive bruising and subcutaneous bleeding. I certainly did not  want to exacerbate that especially with a platelet count of only  40,000.     The patient currently denies any chest pain, but he does have the  excessive bruising of the abdomen and also has petechiae of the lower  extremities. PAST MEDICAL HISTORY:  I did look into his past medical history. He  has had MGUS, MDS, rheumatoid arthritis. PAST SURGICAL HISTORY:  He has had no surgeries. FAMILY HISTORY:  There is no history of any malignancies in the  family. No blood disorders. SOCIAL HISTORY:  He was a smoker of cigarettes. He used to smoke for  about 30 years, a-pack-a-day. He has been advised to quit, still does  smoke. No alcohol. No recreational drug use. CURRENT MEDICATIONS:  Prednisone three times daily, Plaquenil 200 mg  and pantoprazole 40 mg. ALLERGIES:  None. REVIEW OF SYSTEMS:  As mentioned in the history of present illness. PHYSICAL EXAMINATION:  VITAL SIGNS:  Temperature 97.9, pulse 103, blood pressure 128/76,  respirations 16 and unlabored, 97% sat on room air. HEENT:  The head is atraumatic, normocephalic. There is no pupillary  defect. There is no scleral icterus or jaundice. Usually wears  glasses, but he is not wearing them right now. CHEST:  No rales, rhonchi, or wheezing of the lungs. CARDIOVASCULAR:  His heart is actually regular right now. ABDOMEN:  Soft and thin, but he does have a 6-cm bruise of the left  lower part of his abdomen, purpuric area. EXTREMITIES:  He has diffuse petechiae of the lower extremities  without any edema. NEUROLOGIC:  He has no focal neurologic deficits, but I did notice  some fasciculation of his muscles in the left knee area. LABORATORY DATA:  White count 3900, hemoglobin 9.2, hematocrit 26.8,  platelets 39,094. The absolute neutrophil count is 2500. His  chemistry showed sodium 132, potassium 4.1, chloride 95, CO2 of 21,  BUN 20, creatinine 1. His troponin T is elevated at 0.071, albumin  3.4, alk phos 127, ALT 41, AST 75. His bilirubin is 1.7. He does  have a degree of what looks like hepatic dysfunction. The lipase is  102.     IMPRESSION AND PLAN: The patient has myelodysplastic syndrome with  pancytopenia. I actually sent him to [place] for an opinion  regarding his low-grade MDS. He is supposed to be seeing Dr. Joaquin Hall  in [place] again to follow up on 10/03/2017 and then I am supposed to  see him on 10/04/2017. Unfortunately, the patient is now admitted  with chest pain and there is a dilemma as to whether or not he should  undergo anticoagulation. I am advising against it because of his  bleeding and bruising at the present time. He actually may probably  benefit from [facility] and go to [place] for his Cardiology  since he already has a hematologist in [place] as well to handle his  MDS. In the meantime, however, I will transfuse platelets since he  does have bruising and bleeding actively. He also has some hepatic  dysfunction, which could be contributing to some of the issues here. He had an ultrasound of his spleen and liver on 08/07/2017 that showed  splenomegaly, but his liver was normal.  Some of his medications  possibly may be causing this. In the meantime, I would advise against  aspirin or antiplatelet drugs. See orders for further plan.         Nadya Jade D.O.    D: 09/27/2017 7:49:09       T: 09/27/2017 10:56:01     [email or web address]  Job#: [id number]     Doc#: [id number]

## 2017-09-27 NOTE — ED PROVIDER NOTES
Gastrointestinal: Negative for abdominal distention, abdominal pain, blood in stool, constipation, diarrhea, nausea and vomiting. Endocrine: Negative for polydipsia, polyphagia and polyuria. Genitourinary: Negative for decreased urine volume, difficulty urinating, dysuria, enuresis, frequency, hematuria and urgency. Musculoskeletal: Positive for myalgias (lower legs). Negative for arthralgias, back pain, gait problem, neck pain and neck stiffness. Skin: Negative for pallor and rash. Allergic/Immunologic: Positive for immunocompromised state. Neurological: Negative for dizziness, tremors, seizures, syncope, facial asymmetry, weakness, light-headedness, numbness and headaches. Hematological: Negative for adenopathy. Bruises/bleeds easily. Psychiatric/Behavioral: Negative for agitation, hallucinations and suicidal ideas. The patient is not nervous/anxious. PAST MEDICAL HISTORY    has a past medical history of Arthritis and Myelodysplastic syndrome ([facility] [address] SURGICAL HISTORY      has no past surgical history on file. CURRENT MEDICATIONS       Previous Medications    PANTOPRAZOLE (PROTONIX) 40 MG TABLET    Take 1 tablet by mouth daily    PREDNISONE (DELTASONE) 20 MG TABLET    Take 1 tablet by mouth 3 times daily       ALLERGIES     has No Known Allergies. FAMILY HISTORY     has no family status information on file. family history is not on file. SOCIAL HISTORY      reports that he quit smoking about 5 months ago. He does not have any smokeless tobacco history on file. He reports that he does not drink alcohol or use illicit drugs. PHYSICAL EXAM     INITIAL VITALS:  height is 5' 10.5\" (1.791 m) and weight is 170 lb (77.1 kg). His oral temperature is 99 °F (37.2 °C). His blood pressure is 119/75 and his pulse is 103. His respiration is 18 and oxygen saturation is 96%. Physical Exam   Constitutional: He is oriented to person, place, and time.  He appears well-developed and interpreted by the Emergency Department Physician who either signs or Co-signs this chart in the absence of a cardiologist.  EKG interpreted by Cher Pearson DO:    EKG read and interpreted by myself gives impression of sinus tachycardia with heart rate of 108; interval 128; QRS 94;QTc 442; axis 79 9 70. No STEMI. RADIOLOGY: non-plain film images(s) such as CT, Ultrasound and MRI are read by the radiologist.    CT ABDOMEN PELVIS W IV CONTRAST Additional Contrast? None    FINDINGS:    Please see CT of the chest same day for further details of the lung bases. No calcified gallstones or biliary dilatation. Homogeneous enhancement of the liver, spleen, pancreas and adrenal glands. Liver is enlarged measuring 26 centimeters. Spleen is enlarged measuring 13.5 centimeters. No solid renal lesion. No hydronephrosis. Aorta and IVC are normal in caliber. Mild to moderate calcified plaque along the aorta. Urinary bladder and prostate gland are grossly unremarkable. There are few pelvic phleboliths which appear to be separate from the distal ureters. No free fluid or lymphadenopathy in the pelvic cavity. Moderate stool in the right colon. No focal inflammatory changes the bowel. No bowel obstruction. Appendix is not visualized. However, there is no pericecal stranding or fluid to suggest acute inflammation. No hemoperitoneum or retroperitoneal hematoma. Stomach is decompressed. No gross inflammatory changes of the stomach by CT. Lumbar vertebral body heights are preserved. Moderate focal degenerative changes L5-S1 level. Bony pelvis is grossly intact. Impression:    No focal inflammatory changes of the abdomen and pelvis. No hemoperitoneum or retroperitoneal hematoma. Large and small bowel loops are normal in caliber. Appendix is not visualized. However, there is no pericecal stranding or fluid to suggest acute   inflammation. Mild enlargement of the liver and spleen.         CTA CHEST W WO Notable for the following:     Osmolality Calc 266.0 (*)     All other components within normal limits   TROPONIN - Abnormal; Notable for the following:     Troponin T 0.071 (*)     All other components within normal limits   APTT   PROTIME-INR   BRAIN NATRIURETIC PEPTIDE   MAGNESIUM   TSH WITHOUT REFLEX   ETHANOL   ANION GAP   URINE DRUG SCREEN   URINE RT REFLEX TO CULTURE   APTT   APTT   APTT   TYPE AND SCREEN       EMERGENCY DEPARTMENT COURSE:   Vitals:    Vitals:    09/27/17 0155 09/27/17 0316 09/27/17 0500 09/27/17 0508   BP:  114/77 119/75    Pulse:  106 103    Resp:  20 18    Temp: 101.5 °F (38.6 °C)  99 °F (37.2 °C)    TempSrc: Rectal  Oral    SpO2:  96% 96%    Weight:       Height:    5' 10.5\" (1.791 m)     2:21 AM: The patient was seen and evaluated. Appropriate labs were ordered and reviewed. Patient has myelodysplastic disorder. He had some petechia in his lower extremities and some mild pain as he did present prior to being diagnosed. I reviewed his labs. Imaging of the chest abdomen pelvis were obtained secondary to his chest pain shortness of breath. At this point he had negative CT scans of his chest abdomen and pelvis. His troponin however is elevated. Due to his platelet count I did not give the patient aspirin. I then called cardiology and spoke with Rosey Dumont. Discussed the case with him. They will see him in consult. They didn't feel that heparin may be appropriate. However I did not start it at this time. I then discussed the case with the hospitalist Dr. Jim Herrera. He wanted to have heparin started. I did please see orders but then called hematology back and spoke with Dr. Rakesh Arteaga again he under no circumstances wanted heparin started. At this point the patient did have some pain in his feet. I checked for pulses that were good. He was able to move his toes. He had a mild bruise on his abdomen. I do not think that he is actively continually extravasating.   As the CT scan did

## 2017-09-27 NOTE — PROGRESS NOTES
Nutrition Assessment    Type and Reason for Visit: Initial, Positive Nutrition Screen (Unplanned weight loss)    Nutrition Recommendations: Consider MVI as appropriate. Malnutrition Assessment:  · Malnutrition Status: At risk for malnutrition    Nutrition Diagnosis:   · Problem: Inadequate oral intake  · Etiology: related to Catabolic illness     Signs and symptoms:  as evidenced by Intake 25-50%, Intake 50-75%    Nutrition Assessment:  · Subjective Assessment: pt. seen. He mentions fair appetite. Denies chewing or swallowing difficulty. He mentons he consumed 50% of meatloaf, mashed potato, peas, & chocolate milk at lunch. Takes Boost at home, but ran out due to finances. I gave pt. info on pt. assistance program for Ensure ( [facility] O[facility]). He mentions he lost wt. then regained it. Labs : (9/27) include: Na 132, , Alk Phos 127, AST 75, Lipase 102. 6. · Wound Type: None  · Current Nutrition Therapies:  · Oral Diet Orders: Cardiac   · Oral Diet intake: 26-50%, 51-75%  · Oral Nutrition Supplement (ONS) Orders: Standard High Calorie Oral Supplement  · ONS intake:  new order  · Anthropometric Measures:  · Ht: 5' 10\" (177.8 cm)   · Current Body Wt: 174 lb 9.7 oz (79.2 kg)  · Admission Body Wt: 174 lb 9.7 oz (79.2 kg) ((9/27))  · Usual Body Wt: 174 lb (78.9 kg) (per pt. )  · % Weight Change:  ,   0%  · Ideal Body Wt: 166 lb (75.3 kg), % Ideal Body 105%  · BMI Classification: BMI 25.0 - 29.9 Overweight  · Comparative Standards (Estimated Nutrition Needs):  · Estimated Daily Total Kcal: ~2376  · Estimated Daily Protein (g): 79-95 grams    Estimated Intake vs Estimated Needs: Intake Less Than Needs    Nutrition Risk Level: Moderate    Nutrition Interventions:   Start ONS  Continued Inpatient Monitoring, Education not appropriate at this time    Nutrition Evaluation:   · Evaluation: Goals set   · Goals: Pt. will consume 75% or more at meals during LOS.      · Monitoring: Meal Intake, Supplement Intake, Weight, Comparative Standards, Pertinent Labs    See Adult Nutrition Doc Flowsheet for more detail.      Electronically signed by Kimberlee Gross RD, LETY on 9/27/17 at 2:33 PM    Contact Number: [phone]

## 2017-09-27 NOTE — IP AVS SNAPSHOT
Patient Information     Patient Name FATIMAH Garcia 1960         This is your updated medication list to keep with you all times      TAKE these medications     atorvastatin 40 MG tablet   Commonly known as:  LIPITOR   Take 1 tablet by mouth nightly       metoprolol tartrate 25 MG tablet   Commonly known as:  LOPRESSOR   Take 1 tablet by mouth 2 times daily       nitroGLYCERIN 0.4 MG SL tablet   Commonly known as:  NITROSTAT   up to max of 3 total doses. If no relief after 1 dose, call 911.        pantoprazole 40 MG tablet   Commonly known as:  PROTONIX   Take 1 tablet by mouth daily       predniSONE 20 MG tablet   Commonly known as:  DELTASONE       vitamin D 1000 UNIT Tabs tablet   Commonly known as:  CHOLECALCIFEROL

## 2017-09-27 NOTE — CARE COORDINATION
9/27/17, 12:36 PM  Chelsie Chaudhry       Admitted from: ED 9/27/2017/ [facility] day: 0   Location: [address] [place] [address] Reason for admit: NSTEMI (non-ST elevated myocardial infarction) ([facility] [address] [I21.4] Status: inpt  Admit order signed?: yes  PMH:  has a past medical history of Arthritis and Myelodysplastic syndrome ([facility] [address] [facility] Medications:  Scheduled Meds:   pantoprazole  40 mg Oral Daily    predniSONE  20 mg Oral TID    sodium chloride flush  10 mL Intravenous 2 times per day    atorvastatin  40 mg Oral Nightly    metoprolol tartrate  25 mg Oral BID    heparin (porcine)  4,000 Units Intravenous Once    phosphorus replacement protocol   Other RX Placeholder    magnesium replacement protocol   Other RX Placeholder    potassium (CARDIAC) replacement protocol   Other RX Placeholder     Continuous Infusions:    Pertinent Info/Orders/Treatment Plan: Pt admitted with bruising, SOB and chest pain. WBC 3.9, Hgb 9.2, platelets 40 today. Dr. Dixie Bee following for myelodysplastic syndrome and advises against anticoagulation, plans platelet transfusion. Dr. Sakshi Wolfe following and does not plan cardiac work-up.    Diet: DIET CARDIAC;   Vital Signs: /68  Pulse 89  Temp 98.9 °F (37.2 °C) (Oral)   Resp 16  Ht 5' 10\" (1.778 m)  Wt 174 lb 8 oz (79.2 kg)  SpO2 92%  BMI 25.04 kg/m2  DVT Prophylaxis: heparin, SCDs  Influenza Vaccination Screening Completed: yes  Pneumonia Vaccination Screening Completed: yes  Core measures: Pt is \"Not an NSTEMI\" per Dr. Sakshi Wolfe  PCP: Nico Dorantes MD  Readmission: no  Risk Score: 13.75     Discharge Planning  Current Residence:  Private Residence  Living Arrangements:  Spouse/Significant Other   Support Systems:  Spouse/Significant Other  Current Services PTA:     Potential Assistance Needed:  N/A  Potential Assistance Purchasing Medications:  No  Does patient want to participate in local refill/ meds to beds program?  No  Type of Home Care Services:  None  Patient expects to be discharged to: home  Expected Discharge date:  09/28/17  Follow Up Appointment: Best Day/ Time: Monday AM    Discharge Plan: Plans home with wife. We will follow for discharge needs.     Evaluation: no

## 2017-09-27 NOTE — IP AVS SNAPSHOT
After Visit Summary  (Discharge Instructions)    Medication List for Home    Based on the information you provided to us as well as any changes during this visit, the following is your updated medication list.  Compare this with your prescription bottles at home. If you have any questions or concerns, contact your primary care physician's office. Daily Medication List (This medication list can be shared with any healthcare provider who is helping you manage your medications)      There are NEW medications for you. START taking them after you leave the hospital        Last Dose    Next Dose Due AM NOON PM NIGHT    atorvastatin 40 MG tablet   Commonly known as:  LIPITOR   Take 1 tablet by mouth nightly                40 mg on 9/27/2017  8:20 PM     Tonight                          metoprolol tartrate 25 MG tablet   Commonly known as:  LOPRESSOR   Take 1 tablet by mouth 2 times daily                25 mg on 9/28/2017  8:10 AM     Tonight                             nitroGLYCERIN 0.4 MG SL tablet   Commonly known as:  NITROSTAT   up to max of 3 total doses. If no relief after 1 dose, call 911.                   As needed                         These are medications you told us you were taking at home, CONTINUE taking them after you leave the hospital        Last Dose    Next Dose Due AM NOON PM NIGHT    pantoprazole 40 MG tablet   Commonly known as:  PROTONIX   Take 1 tablet by mouth daily                40 mg on 9/28/2017  8:10 AM     Tomorrow  09/29/17                            predniSONE 20 MG tablet   Commonly known as:  DELTASONE   Take 20 mg by mouth daily                20 mg on 9/28/2017  8:10 AM     Tomorrow  09/29/17                            vitamin D 1000 UNIT Tabs tablet   Commonly known as:  CHOLECALCIFEROL   Take 1,000 Units by mouth daily                  Tomorrow  09/29/17                                 Where to Get Your Medications These medications were sent to [facility], [address] [place] [address]  [address] [facility], [address] [address]     Phone:  [phone]     atorvastatin 40 MG tablet    metoprolol tartrate 25 MG tablet    nitroGLYCERIN 0.4 MG SL tablet               Allergies as of 2017     No Known Allergies      Immunizations as of 2017     No immunizations on file. Last Vitals          Most Recent Value    Temp  97.9 °F (36.6 °C)    Pulse  96    Resp  16    BP  112/65         After Visit Summary    This summary was created for you. Thank you for entrusting your care to us. The following information includes details about your hospital/visit stay along with steps you should take to help with your recovery once you leave the hospital.  In this packet, you will find information about the topics listed below:    · Instructions about your medications including a list of your home medications  · A summary of your hospital visit  · Follow-up appointments once you have left the hospital  · Your care plan at home      You may receive a survey regarding the care you received during your stay. Your input is valuable to us. We encourage you to complete and return your survey in the envelope provided. We hope you will choose us in the future for your healthcare needs. Patient Information     Patient Name FATIMAH Sanderson 1960      Care Provided at:     Name Address Phone       [address] [phone]            Your Visit    Here you will find information about your visit, including the reason for your visit. Please take this sheet with you when you visit your doctor or other health care provider in the future. It will help determine the best possible medical care for you at that time.   If you have any questions once you leave the hospital, please call the department phone number listed below. Why you were here     Your primary diagnosis was:  Nstemi (Non-St Elevated Myocardial Infarction) (H[facility])    Your diagnoses also included:  Pancytopenia (Hcc), Hyponatremia, Thrombocytopenia (Hcc), Elevated Lfts, Metabolic Acidosis, Hypoalbuminemia, History Of Tobacco Abuse, Fatigue, Splenomegaly, Copd Without Exacerbation ([facility]), Chest Pain      Visit Information     Date & Time Provider Department Dept. Phone    9/27/2017 Zahra Martinez MD [address] [place] [address] [phone]       Follow-up Appointments    Below is a list of your follow-up and future appointments. This may not be a complete list as you may have made appointments directly with providers that we are not aware of or your providers may have made some for you. Please call your providers to confirm appointments. It is important to keep your appointments. Please bring your current insurance card, photo ID, co-pay, and all medication bottles to your appointment. If self-pay, payment is expected at the time of service. Follow-up Information     Follow up with Jassi Starkey MD. Schedule an appointment as soon as possible for a visit in 1 week. Specialty:  General Practice    Contact information:    [address]  [address]  [phone]          Follow up with Lisa Adkins DO On 10/4/2017. Specialty:  Hematology and Oncology    Why:  As scheduled    Contact information:    [address]  [facility] [place] [address]  [phone]          Follow up with [address] On 10/3/2017. Why:  As scheduled    Contact information:    [address] [facility]        Preventive Care        Date Due    Hepatitis C screening is recommended for all adults regardless of risk factors born between [facility] at least once (lifetime) who have never been tested.  1960 If you don't have a [employer] username and password but a parent or guardian has access to your record, the parent or guardian should login with their own [employer] username and password and access your record to view the After Visit Summary. Additional Information  If you have questions, please contact the physician practice where you receive care. Remember, MyChart is NOT to be used for urgent needs. For medical emergencies, dial 911. For questions regarding your MyChart account call [phone]. If you have a clinical question, please call your doctor's office. View your information online  ? Review your current list of  medications, immunization, and allergies. ? Review your future test results online . ? Review your discharge instructions provided by your caregivers at discharge    Certain functionality such as prescription refills, scheduling appointments or sending messages to your provider are not activated if your provider does not use [employer] in his/her office    For questions regarding your MyChart account call [phone]. If you have a clinical question, please call your doctor's office. The information on all pages of the After Visit Summary has been reviewed with me, the patient and/or responsible adult, by my health care provider(s). I had the opportunity to ask questions regarding this information. I understand I should dispose of my armband safely at home to protect my health information. A complete copy of the After Visit Summary has been given to me, the patient and/or responsible adult.            Patient Signature/Responsible Adult:____________________    Clinician Signature:_____________________    Date:_____________________    Time:_____________________

## 2017-09-27 NOTE — H&P
dizziness, gait problems and vertigo  Behavioral/Psych:negative for mood/sleep disturbance      PHYSICAL EXAM:  Vitals:Patient Vitals for the past 24 hrs:   BP Temp Temp src Pulse Resp SpO2 Height Weight   09/27/17 0856 124/68 98.2 °F (36.8 °C) Oral 109 18 94 % - -   09/27/17 0832 128/72 98.2 °F (36.8 °C) Oral 113 20 93 % - -   09/27/17 0612 - - - - - - 5' 10\" (1.778 m) 174 lb 8 oz (79.2 kg)   09/27/17 0606 128/76 97.9 °F (36.6 °C) Oral 103 16 97 % - -   09/27/17 0508 - - - - - - 5' 10.5\" (1.791 m) -   09/27/17 0500 119/75 99 °F (37.2 °C) Oral 103 18 96 % - -   09/27/17 0316 114/77 - - 106 20 96 % - -   09/27/17 0155 - 101.5 °F (38.6 °C) Rectal - - - - -   09/27/17 0142 (!) 141/77 97.8 °F (36.6 °C) Oral 113 24 96 % - 170 lb (77.1 kg)       Last 3 weights: Wt Readings from Last 3 Encounters:   09/27/17 174 lb 8 oz (79.2 kg)   08/25/17 165 lb (74.8 kg)   08/01/17 163 lb 3.2 oz (74 kg)     24 hour intake/output:No intake or output data in the 24 hours ending 09/27/17 1114  BMI:Body mass index is 25.04 kg/(m^2). General Appearance: alert and oriented to person, place and time, well developed and well- nourished, in no acute distress  Skin: warm and dry, no rash or erythema  Eyes: pupils equal, round, and reactive to light, extraocular eye movements intact, conjunctivae normal  Neck: supple and non-tender without mass, no thyromegaly or thyroid nodules, no cervical lymphadenopathy  Pulmonary/Chest: clear to auscultation bilaterally- no wheezes, rales or rhonchi, normal air movement, no respiratory distress  Cardiovascular: normal rate, regular rhythm, normal S1 and S2, no murmurs, rubs, clicks, or gallops, distal pulses intact, no carotid bruits, Negative JVD  Radial Pulses: intact 2+  Abdomen: soft, non-tender, non-distended, normal bowel sounds, no masses or organomegaly  Extremities: no cyanosis, clubbing .  Edema  Musculoskeletal: normal range of motion, no joint swelling, deformity or tenderness    LABS:  Recent

## 2017-09-27 NOTE — IP AVS SNAPSHOT
Patient Information     Patient Name FATIMAH Mg 1960      Important Information for Acute MI     If your condition worsens or if you have any concerns, call your doctor or seek emergency medical services (dial ) as needed. If you have any of the following symptoms/conditions, call your doctor. Call your primary care physician to obtain results of outstanding lab tests, cultures, x-rays, or other tests. ACUTE MI (Heart Attack) PATIENTS ONLY:  Exercise improves your strength and stamina. Your doctor will discuss an exercise program that meets your needs.  In some cases, a cardiac rehabilitation program may be helpful.    ___  Aspirin prescribed or ___ Not applicable  ___  Beta Blocker prescribed or ___ Not applicable    ___  Statin prescribed  or ___ Not applicable

## 2017-09-27 NOTE — H&P
fatigue, no diaphoresis, no appetite  change, no activity change, no unexpected weight change. RESPIRATORY:  He has got some shortness of breath but no cough, no  phlegm production, no hemoptysis. No choking. No chest tightness. No wheezing. No stridor. CARDIOVASCULAR:  Positive for chest pain as mentioned above in the  left midsternal area. It is like a pressure and does not radiate  anywhere. Negative for palpitations or leg swelling. Chest pain is  now gone and was not very vague in the first place, even though he  stated it was 8/10. GASTROINTESTINAL:  Negative for abdominal distention, abdominal pain,  blood in stool, constipation, diarrhea, nausea and vomiting, or  hematemesis. GENITOURINARY:  No decreased urine volume. No difficulty urinating. No dysuria, no anuresis, no increased frequency, no hematuria, no  urgency. MUSCULOSKELETAL:  Positive for myalgias in lower legs, in  particularly, the bottom of his feet. Negative for arthralgias, back  pain, gait problem, neck pain, or neck stiffness. NEUROLOGIC:  No dizziness, no tremors, no seizures, no syncope. No  facial asymmetry. No weakness, no lightheadedness, no numbness, and  no headaches. No focal neurologic symptoms indicative of a stroke. ALLERGIC/IMMUNOLOGIC:  Positive for immunocompromised state. HEMATOLOGIC:  Negative for adenopathy, bruises, and bleeds easily. All other systems reviewed and are negative except as mentioned above  or in HPI. PAST MEDICAL HISTORY:  Arthritis and myelodysplastic syndrome. No  cardiac history he has had. PAST SURGICAL HISTORY:  No past surgical history on file. CARDIOLOGY CHART REVIEW:  There is no echo's on cardiology chart  review on the computer. FAMILY MEDICAL HISTORY:  No family medical history on file. SOCIAL HISTORY:  He is a former smoker, quit on 04/01/2017. Smoked a  pack per day for 30 to 40 years. He does admit never used smokeless  tobacco.  Does not drink alcohol. somewhat distant due to COPD though. No S3. No S4. No other murmurs, gallops, rubs, or clicks. ABDOMEN:  Soft. Nondistended. No tenderness. No rebound. No  guarding. No peritoneal signs. No masses felt. Bowel sounds are  present in all four quadrants. EXTREMITIES:  No cyanosis, clubbing, or edema. Radial pulses are  4+/4+ bilaterally. Pedal pulses are 1+/4+ bilaterally. There is no  pedal edema. No ankle edema. No calf tenderness. Jackson Lissy' sign is  negative. No evidence of DVT. There are multiple petechial  confluence and petechial rash on his lower legs, and the bottom of his  feet hurt. Nonblanchable petechiae. NEUROLOGIC:  Alert and oriented x3. GCS 15. Cranial nerves II-XII  are intact. Motor is 5/5 in all extremities in all muscle groups. Sensory exam is symmetrical and within normal limits. SKIN:  Warm and dry with bruising on the abdomen and in the left lower  quadrant. The petechiae are nonblanching in the lower extremities,  particularly the lower calves. No rash noted. Nondiaphoretic. PSYCHIATRIC:  Normal mood and affect. Speech is normal.  Behavior is  normal.  Judgment and thought content are normal.    EKG shows sinus tachycardia at 108 beats per minute. There is a lot  of baseline artifacts, so it is hard to make some judgments from that. He has got incomplete right bundle branch block with just R-prime  pattern in V1 and V2. No ST-T wave changes indicative of any ischemia  though. CTA of chest shows no pulmonary embolus. Mild ground-glass opacities  at the dependent portion of the lungs. Mild bronchial wall  thickening. This may represent minimal congestion. No pleural  effusion. No dense consolidation. Mild paraseptal emphysema. CT of abdomen and pelvis with IV contrast showed no focal inflammatory  changes of the abdomen and pelvis. No hemoperitoneum, no  retroperitoneal hematoma. Large and small bowels loops are normal in  caliber.   The appendix is not visualized. There is no cecal stranding  or fluid to suggest acute inflammation. Mild enlargement of liver and  spleen. LABORATORY DATA:  Sodium is down a little bit at 132. Potassium 4.1. Chloride down a bit at 95. Bicarb is down a bit at 21. BUN 20. Creatinine 1.0. Estimated GFR 76. Anion gap 16.0. Magnesium 1.6. Glucose 78. Calcium 9.1. Osmolality 266.0. Total protein 7.7. ProBNP is normal at 120.3. Troponins are positive at 0.069 and 0.071. Albumin is 3.4. Alkaline phosphatase is up just here at 127. ALT is  normal at 41. AST is high at 75. Bilirubin is high at 1.7. Direct  bilirubin is high at 0.5. Lipase is high at 102. 6. Total protein is  normal at 7.7. TSH is normal at 1. 14. Alcohol is less than 0.01. Drug screen in the urine is negative for everything. CBC showed white  blood cell count of 4.3 with a normal differential.  It was higher  than 4.3, is down a bit. H and H are 10.5 and 29.8 which are very  much normal.  RBC morphology is RBC indices are normal except MCH is  up at 32.5. RDW is at 18.01, he has 1+ anisocytosis in the RBC  morphology. Platelet count is only 44. INR is 1.03. APTT is 31.7.    UA is negative with negative nitrite, negative leukocyte esterase. Negative most anything else. Type and screen showed he was O+ with  negative antibody screen. APTT 33 seconds. ASSESSMENT AND PLAN:  1.  Non-ST elevation MI with mild chest pain. No EKG changes  indicative of anything particularly, but he has got definite troponin  leak. I do not know whether Cardiology is going to do a stress test  on him or do a cardiac cath on him and with the platelets since the  ER, I do not know whether they will do a cardiac cath on him. 2.  Myelodysplastic syndrome. Awaiting further management from  [place]. Might be a bone marrow transplant.   Pretty poor platelet  increments with transfusion in the past and probably he needs to have  agilely manage platelets for

## 2017-09-27 NOTE — PLAN OF CARE
Problem: Pain:  Goal: Pain level will decrease  Pain level will decrease   Outcome: Ongoing  Utilizing PRN PO pain meds with relief  Goal: Control of acute pain  Control of acute pain   Outcome: Ongoing  C/O rylan foot pain, relieved with norco  Goal: Control of chronic pain  Control of chronic pain   Outcome: Met This Shift  Denies chronic pain    Problem: Nutrition  Goal: Optimal nutrition therapy  Outcome: Ongoing  Eating 100% of meals, supplements added to meals per dietary recommendations    Problem: Tissue Perfusion - Cardiopulmonary, Altered:  Goal: Absence of angina  Absence of angina  Outcome: Met This Shift  Denies chest pain/anginal pain  Goal: Hemodynamic stability will improve  Hemodynamic stability will improve  Outcome: Ongoing  VSS q 4 hr checks, tele mon NSR/no ectopy noted

## 2017-09-27 NOTE — ED NOTES
Pt sitting up in bed with pain to bilateral feet significant other reports that Dr. Mya Flores was just in and informed them of there admission status and that he was going to order some pain medication. Pt medicated per order 10/10 pain at this time. Vs reassessed and pt and significant other informed of the admission process. Clean catch urine collected and sent to lab also.  Call light in reach with sr up x2     Annamarie Yañez RN  09/27/17 4298

## 2017-09-27 NOTE — ED TRIAGE NOTES
Pt to ed with c/o bruising to bilateral lower extremities and sob. Ekg performed and pt placed on the cardiac monitor. Lung sounds diminished to the right upper lobes and pt states pain to that area with a deep breath. Bowel sounds active x4 with a bruise to the llq. Pt states currently is to the feet and that the bruising was not there prior to going to work.  Dr. Francy Marte into assess pt and discuss the POC

## 2017-09-28 VITALS
RESPIRATION RATE: 16 BRPM | OXYGEN SATURATION: 94 % | SYSTOLIC BLOOD PRESSURE: 112 MMHG | HEIGHT: 70 IN | TEMPERATURE: 97.9 F | HEART RATE: 96 BPM | DIASTOLIC BLOOD PRESSURE: 65 MMHG | BODY MASS INDEX: 25 KG/M2 | WEIGHT: 174.6 LBS

## 2017-09-28 LAB
ALBUMIN SERPL-MCNC: 2.9 G/DL (ref 3.5–5.1)
ALP BLD-CCNC: 117 U/L (ref 38–126)
ALT SERPL-CCNC: 33 U/L (ref 11–66)
ANION GAP SERPL CALCULATED.3IONS-SCNC: 13 MEQ/L (ref 8–16)
ANISOCYTOSIS: ABNORMAL
AST SERPL-CCNC: 62 U/L (ref 5–40)
BASOPHILIA: SLIGHT
BASOPHILIC STIPPLING: SLIGHT
BASOPHILS # BLD: 1.2 %
BASOPHILS ABSOLUTE: 0 THOU/MM3 (ref 0–0.1)
BILIRUB SERPL-MCNC: 0.9 MG/DL (ref 0.3–1.2)
BUN BLDV-MCNC: 14 MG/DL (ref 7–22)
CALCIUM SERPL-MCNC: 8.4 MG/DL (ref 8.5–10.5)
CHLORIDE BLD-SCNC: 101 MEQ/L (ref 98–111)
CHOLESTEROL, TOTAL: 97 MG/DL (ref 100–199)
CO2: 22 MEQ/L (ref 23–33)
CREAT SERPL-MCNC: 0.8 MG/DL (ref 0.4–1.2)
EKG ATRIAL RATE: 76 BPM
EKG P AXIS: 81 DEGREES
EKG P-R INTERVAL: 138 MS
EKG Q-T INTERVAL: 394 MS
EKG QRS DURATION: 106 MS
EKG QTC CALCULATION (BAZETT): 443 MS
EKG R AXIS: 36 DEGREES
EKG T AXIS: 54 DEGREES
EKG VENTRICULAR RATE: 76 BPM
EOSINOPHIL # BLD: 1.1 %
EOSINOPHILS ABSOLUTE: 0 THOU/MM3 (ref 0–0.4)
GFR SERPL CREATININE-BSD FRML MDRD: > 90 ML/MIN/1.73M2
GLUCOSE BLD-MCNC: 82 MG/DL (ref 70–108)
HCT VFR BLD CALC: 24.6 % (ref 42–52)
HDLC SERPL-MCNC: 26 MG/DL
HEMOGLOBIN: 8.4 GM/DL (ref 14–18)
INR BLD: 1.03 (ref 0.85–1.13)
LACTIC ACID: 1 MMOL/L (ref 0.5–2.2)
LDL CHOLESTEROL CALCULATED: 55 MG/DL
LYMPHOCYTES # BLD: 50.8 %
LYMPHOCYTES ABSOLUTE: 1.3 THOU/MM3 (ref 1–4.8)
MAGNESIUM: 2 MG/DL (ref 1.6–2.4)
MCH RBC QN AUTO: 31.9 PG (ref 27–31)
MCHC RBC AUTO-ENTMCNC: 34.2 GM/DL (ref 33–37)
MCV RBC AUTO: 93.5 FL (ref 80–94)
MONOCYTES # BLD: 9.6 %
MONOCYTES ABSOLUTE: 0.2 THOU/MM3 (ref 0.4–1.3)
NUCLEATED RED BLOOD CELLS: 0 /100 WBC
PDW BLD-RTO: 18.6 % (ref 11.5–14.5)
PHOSPHORUS: 2.9 MG/DL (ref 2.4–4.7)
PLATELET # BLD: 33 THOU/MM3 (ref 130–400)
PLATELET ESTIMATE: ABNORMAL
PMV BLD AUTO: 8.4 MCM (ref 7.4–10.4)
POIKILOCYTES: ABNORMAL
POTASSIUM SERPL-SCNC: 3.5 MEQ/L (ref 3.5–5.2)
PRO-BNP: 457.7 PG/ML (ref 0–900)
RBC # BLD: 2.63 MILL/MM3 (ref 4.7–6.1)
RBC # BLD: ABNORMAL 10*6/UL
SCAN OF BLOOD SMEAR: NORMAL
SEG NEUTROPHILS: 37.3 %
SEGMENTED NEUTROPHILS ABSOLUTE COUNT: 1 THOU/MM3 (ref 1.8–7.7)
SODIUM BLD-SCNC: 136 MEQ/L (ref 135–145)
SPHEROCYTES: ABNORMAL
TOTAL PROTEIN: 6.8 G/DL (ref 6.1–8)
TRIGL SERPL-MCNC: 82 MG/DL (ref 0–199)
WBC # BLD: 2.6 THOU/MM3 (ref 4.8–10.8)

## 2017-09-28 PROCEDURE — 83735 ASSAY OF MAGNESIUM: CPT

## 2017-09-28 PROCEDURE — 80061 LIPID PANEL: CPT

## 2017-09-28 PROCEDURE — 85025 COMPLETE CBC W/AUTO DIFF WBC: CPT

## 2017-09-28 PROCEDURE — 80053 COMPREHEN METABOLIC PANEL: CPT

## 2017-09-28 PROCEDURE — 84100 ASSAY OF PHOSPHORUS: CPT

## 2017-09-28 PROCEDURE — 83605 ASSAY OF LACTIC ACID: CPT

## 2017-09-28 PROCEDURE — 85610 PROTHROMBIN TIME: CPT

## 2017-09-28 PROCEDURE — 6370000000 HC RX 637 (ALT 250 FOR IP): Performed by: ANESTHESIOLOGY

## 2017-09-28 PROCEDURE — 99239 HOSP IP/OBS DSCHRG MGMT >30: CPT | Performed by: INTERNAL MEDICINE

## 2017-09-28 PROCEDURE — 36415 COLL VENOUS BLD VENIPUNCTURE: CPT

## 2017-09-28 PROCEDURE — 93005 ELECTROCARDIOGRAM TRACING: CPT | Performed by: ANESTHESIOLOGY

## 2017-09-28 PROCEDURE — 83880 ASSAY OF NATRIURETIC PEPTIDE: CPT

## 2017-09-28 PROCEDURE — 6370000000 HC RX 637 (ALT 250 FOR IP): Performed by: INTERNAL MEDICINE

## 2017-09-28 RX ORDER — ATORVASTATIN CALCIUM 40 MG/1
40 TABLET, FILM COATED ORAL NIGHTLY
Qty: 30 TABLET | Refills: 3 | Status: SHIPPED | OUTPATIENT
Start: 2017-09-28

## 2017-09-28 RX ORDER — NITROGLYCERIN 0.4 MG/1
TABLET SUBLINGUAL
Qty: 25 TABLET | Refills: 3 | Status: SHIPPED | OUTPATIENT
Start: 2017-09-28

## 2017-09-28 RX ADMIN — METOPROLOL TARTRATE 25 MG: 25 TABLET ORAL at 08:10

## 2017-09-28 RX ADMIN — PANTOPRAZOLE SODIUM 40 MG: 40 TABLET, DELAYED RELEASE ORAL at 08:10

## 2017-09-28 RX ADMIN — PREDNISONE 20 MG: 20 TABLET ORAL at 08:10

## 2017-09-28 ASSESSMENT — PAIN SCALES - GENERAL
PAINLEVEL_OUTOF10: 0
PAINLEVEL_OUTOF10: 0

## 2017-09-28 NOTE — PLAN OF CARE
Problem: Pain:  Goal: Pain level will decrease  Pain level will decrease   Outcome: Ongoing  Patient c/o pain in his feet at HS. PRN pain medication given, with effect. Goal: Control of acute pain  Control of acute pain   Outcome: Ongoing  Goal: Control of chronic pain  Control of chronic pain   Outcome: Ongoing    Problem: Nutrition  Goal: Optimal nutrition therapy  Outcome: Ongoing  Patient states his diet is adequate. Problem: Tissue Perfusion - Cardiopulmonary, Altered:  Goal: Absence of angina  Absence of angina   Outcome: Ongoing  No c/o of angina noted. Goal: Hemodynamic stability will improve  Hemodynamic stability will improve   Outcome: Ongoing  VSS    Comments:   Care plan reviewed with patient. Patient verbalize understanding of the plan of care and contribute to goal setting.

## 2017-09-28 NOTE — PROGRESS NOTES
Hospitalist Progress Note      PCP: Jessica Walters MD    Date of Admission: 9/27/2017    Chief Complaint: chest pain    Initial H and P  A 38-year-old  male who presents  for evaluation of bruising, shortness of breath, and chest pain. He  states that he has a history of myelodysplastic disease and that is  being worked up for possibility of a bone marrow transplant. He went  to work today for the first time in a while. He states that he ran  into a cart at work and developed a bruise across his abdomen. Also  states that he developed bruises on his lower legs from walk around  while at work and that the legs are painful, and the bottom of his  feet are painful. States that he gets short of breath with movement  and that his voice gets hoarser throughout the day. Also states he is  experiencing chest pain which is being an 8/10 in severity and denies  coughs or pressure. Denies any trouble swallowing. States that he  was having difficulty with bleeding earlier this week, as his gums and  nose were bleeding, and they bleed frequently when he wakes up at  night, when he wakes up he has a taste of blood in his mouth. I think  that his oncologist/hematologist is Dr. Skinner, and he denies being on  any blood thinners. He states he used to smoke, but quit in April.    Smoked a pack per day for 30-40 years.       Subjective: doing ok   NO GROSS BLEEDS  SEEN by dr Juan Espinoza and cardiology- ok for discharge  No nstemi per cardio       Medications:  Reviewed    Infusion Medications    Scheduled Medications    pantoprazole  40 mg Oral Daily    sodium chloride flush  10 mL Intravenous 2 times per day    atorvastatin  40 mg Oral Nightly    metoprolol tartrate  25 mg Oral BID    phosphorus replacement protocol   Other RX Placeholder    magnesium replacement protocol   Other RX Placeholder    potassium (CARDIAC) replacement protocol   Other RX 4.1  3.5   CL  95*  101   CO2  21*  22*   BUN  20  14   CREATININE  1.0  0.8   CALCIUM  9.1  8.4*   PHOS   --   2.9     Recent Labs      09/27/17   0206  09/28/17   0438   AST  75*  62*   ALT  41  33   BILIDIR  0.5*   --    BILITOT  1.7*  0.9   ALKPHOS  127*  117     Recent Labs      09/27/17   0206  09/28/17   0438   INR  1.03  1.03     No results for input(s): [place] in the last 72 hours. Urinalysis:    Lab Results   Component Value Date    NITRU NEGATIVE 09/27/2017    BLOODU NEGATIVE 09/27/2017    GLUCOSEU NEGATIVE 09/27/2017       Radiology:  CT ABDOMEN PELVIS W IV CONTRAST Additional Contrast? None   Final Result   1. No retroperitoneal hematoma or other retroperitoneal collection. 2.  Other collections to suggest hematoma within muscle or the subcutaneous soft tissues. 3.  Mild to moderate hepatosplenomegaly. 4.  Numerous subthreshold inguinal lymph nodes bilaterally. No bulky adenopathy. **This report has been created using voice recognition software. It may contain minor errors which are inherent in voice recognition technology. **      Final report electronically signed by Dr. Long Villafuerte on 9/27/2017 1:00 PM      CTA CHEST W WO CONTRAST   Final Result       1. No pulmonary emboli. 2. Faint bilateral increased density to the lungs bilaterally. This can are present diffuse infection or mild inflammation. This could also represent pulmonary edema. **This report has been created using voice recognition software. It may contain minor errors which are inherent in voice recognition technology. **      Final report electronically signed by Dr. Berta Harris on 9/27/2017 8:33 AM        Cta Chest W Wo Contrast    Result Date: 9/27/2017  PROCEDURE: CTA CHEST W WO CONTRAST CLINICAL INFORMATION: dyspnea, myelodysplastic disorder tachycardia. . Slight shortness of breath. Bruising to the left lower abdomen and legs. COMPARISON: CTA chest 8/3/2017.  TECHNIQUE: 1.5 mm axial images were obtained through the chest after the administration of IV contrast.  A non-contrast localizer was obtained. 3D reconstructions were performed on the scanner to include sagittal and coronal images through the chest. Isovue was the intravenous contrast utilized. All CT scans at this facility use dose modulation, iterative reconstruction, and/or weight-based dosing when appropriate to reduce radiation dose to as low as reasonably achievable. FINDINGS: There is adequate opacification of the pulmonary arterial system. No pulmonary emboli are present. The aorta is within acceptable limits. The heart size is normal. There is no pericardial or pleural effusion. There are calcified left hilar lymph nodes. There is no mediastinal or axillary adenopathy. There is some linear atelectasis in the right middle lobe. There is a hazy appearance to the lung fields which is new compared to the prior examination. This can represent subtle infiltrates. No focal areas of consolidation are noted. No suspicious osseous lesions are present. There are no suspicious findings in the imaged aspects of the upper abdomen. 1. No pulmonary emboli. 2. Faint bilateral increased density to the lungs bilaterally. This can are present diffuse infection or mild inflammation. This could also represent pulmonary edema. **This report has been created using voice recognition software. It may contain minor errors which are inherent in voice recognition technology. ** Final report electronically signed by Dr. Portillo Dailey on 9/27/2017 8:33 AM    Ct Abdomen Pelvis W Iv Contrast Additional Contrast? None    Result Date: 9/27/2017  PROCEDURE: CT ABDOMEN PELVIS W IV CONTRAST CLINICAL INFORMATION: left lower quadrant abdominal bruising low platelet count please exclude retroperitoneal hematoma. . COMPARISON: None.  TECHNIQUE: 5 mm axial CT images were obtained through the abdomen and pelvis after the administration of intravenous and oral Dr. Hayden Willams on 9/27/2017 1:00 PM          Assessment/Plan:    Principal Problem:    No - NSTEMI (non-ST elevated myocardial infarction)- no nstemi er cardio, cannot be on antiplts or anticoag sec to MDS and low plts- on statin, nitro, bb, no ace- sec to low bp  Active Problems:    Pancytopenia ([facility] [address]- sec to MDS- on steroids- per dr Alonso Garsia- ok for discharge per dr Alonso Garsia    Thrombocytopenia ([facility] [address]- has ITP- on steroids    Elevated LFTs    Metabolic acidosis- resolved    Hypoalbuminemia    COPD without exacerbation ([facility] [address]    Chest pain- not nstemi per cardio    Ok for home    Discharge time:- 35 mins  F/u with pcp in one week, [facility] oncology and dr Alonso Garsia as scheduled          Code Status: Full Code      Jonnathan Morales MD

## 2017-09-28 NOTE — FLOWSHEET NOTE
Discharge instructions to pt and wife using teach back method. F/U with  Dr Karen Cortés, and [facility] doctor Dr Meredith Roldan. Meds reviewed per pharmacist. Denies Venecia Crimes or concerns, verbalizes understanding. 1345: Taken to discharge lobby via wheelchair with wife.

## 2022-11-01 ENCOUNTER — APPOINTMENT (OUTPATIENT)
Dept: GENERAL RADIOLOGY | Age: 62
End: 2022-11-01
Payer: COMMERCIAL

## 2022-11-01 ENCOUNTER — HOSPITAL ENCOUNTER (EMERGENCY)
Age: 62
Discharge: HOME | End: 2022-11-01
Payer: COMMERCIAL

## 2022-11-01 VITALS
BODY MASS INDEX: 23.8 KG/M2 | HEIGHT: 71 IN | WEIGHT: 170 LBS | HEART RATE: 82 BPM | OXYGEN SATURATION: 100 % | DIASTOLIC BLOOD PRESSURE: 75 MMHG | SYSTOLIC BLOOD PRESSURE: 134 MMHG | RESPIRATION RATE: 16 BRPM | TEMPERATURE: 97.8 F

## 2022-11-01 DIAGNOSIS — M17.11 ARTHRITIS OF KNEE, RIGHT: Primary | ICD-10-CM

## 2022-11-01 PROCEDURE — 6370000000 HC RX 637 (ALT 250 FOR IP): Performed by: NURSE PRACTITIONER

## 2022-11-01 PROCEDURE — 73564 X-RAY EXAM KNEE 4 OR MORE: CPT

## 2022-11-01 PROCEDURE — 99283 EMERGENCY DEPT VISIT LOW MDM: CPT

## 2022-11-01 RX ORDER — HYDROCODONE BITARTRATE AND ACETAMINOPHEN 5; 325 MG/1; MG/1
1 TABLET ORAL EVERY 6 HOURS PRN
Qty: 10 TABLET | Refills: 0 | Status: SHIPPED | OUTPATIENT
Start: 2022-11-01 | End: 2022-11-04

## 2022-11-01 RX ORDER — PREDNISONE 20 MG/1
40 TABLET ORAL ONCE
Status: COMPLETED | OUTPATIENT
Start: 2022-11-01 | End: 2022-11-01

## 2022-11-01 RX ORDER — METHYLPREDNISOLONE 4 MG/1
TABLET ORAL
Qty: 1 KIT | Refills: 0 | Status: SHIPPED | OUTPATIENT
Start: 2022-11-01 | End: 2022-11-07

## 2022-11-01 RX ADMIN — PREDNISONE 40 MG: 20 TABLET ORAL at 11:22

## 2022-11-01 RX ADMIN — DICLOFENAC SODIUM 4 G: 10 GEL TOPICAL at 11:41

## 2022-11-01 ASSESSMENT — ENCOUNTER SYMPTOMS
ABDOMINAL DISTENTION: 0
CHEST TIGHTNESS: 0
SHORTNESS OF BREATH: 1
ABDOMINAL PAIN: 0
RHINORRHEA: 0
NAUSEA: 0
WHEEZING: 1
VOMITING: 0
SORE THROAT: 0
COLOR CHANGE: 0
BACK PAIN: 0
COUGH: 1

## 2022-11-01 ASSESSMENT — PAIN DESCRIPTION - DESCRIPTORS: DESCRIPTORS: SPASM

## 2022-11-01 ASSESSMENT — PAIN SCALES - GENERAL: PAINLEVEL_OUTOF10: 5

## 2022-11-01 ASSESSMENT — PAIN DESCRIPTION - FREQUENCY: FREQUENCY: CONTINUOUS

## 2022-11-01 ASSESSMENT — PAIN - FUNCTIONAL ASSESSMENT: PAIN_FUNCTIONAL_ASSESSMENT: 0-10

## 2022-11-01 ASSESSMENT — PAIN DESCRIPTION - LOCATION: LOCATION: KNEE

## 2022-11-01 ASSESSMENT — PAIN DESCRIPTION - PAIN TYPE: TYPE: ACUTE PAIN

## 2022-11-01 ASSESSMENT — PAIN DESCRIPTION - ORIENTATION: ORIENTATION: RIGHT

## 2022-11-01 NOTE — Clinical Note
Marah Espino was seen and treated in our emergency department on 11/1/2022. He may return to work on 11/04/2022. If you have any questions or concerns, please don't hesitate to call.       Bishnu Conley, APRN - CNP

## 2022-11-01 NOTE — ED NOTES
Pt presents to ED with R knee pain. Pt states no injury, knee started hurting and has been getting worse. Pt states inability to walk on knee and states \"feels like its giving out\".       Isha Khan  11/01/22 1046

## 2022-11-01 NOTE — ED PROVIDER NOTES
[facility] Emergency Department    CHIEF COMPLAINT       Chief Complaint   Patient presents with    Knee Pain       Nurses Notes reviewed and I agree except as noted in the HPI. HISTORY OF PRESENT ILLNESS    Tayo Wood is a 57 y.o. male who presents to the ED for evaluation of knee pain. Patient notes chronic knee pain got worse yesterday. Having difficulty bending his leg. Notes he went to work last night was only able to work half a shift due to right knee pain. He notes some mild swelling to the knee. He denies any traumatic injury to the knee. He states he may have twisted it recently causing increased pain. He rates it a 5 out of 10 at rest, 10 out of 10 with movement or standing. He feels like it is going to give out. He has not tried taking any medication for it. He notes a history of myelodysplastic syndrome. Status post allogeneic stem cell transplant. He denies being on any I immunosuppression. He denies any significant swelling or tenderness to the calf. Denies history of blood clots in the past.  He notes some chronic shortness of breath, cough and wheezing which she associates with cigarette smoking, possibly underlying COPD. He notes he currently takes no prescription medicine daily. HPI was provided by the patient. REVIEW OF SYSTEMS     Review of Systems   Constitutional:  Negative for activity change, chills and fever. HENT:  Negative for congestion, rhinorrhea and sore throat. Respiratory:  Positive for cough, shortness of breath and wheezing. Negative for chest tightness. Cardiovascular:  Negative for chest pain. Gastrointestinal:  Negative for abdominal distention, abdominal pain, nausea and vomiting. Genitourinary:  Negative for decreased urine volume and difficulty urinating. Musculoskeletal:  Positive for arthralgias, gait problem and joint swelling. Negative for back pain and myalgias. Skin:  Negative for color change, rash and wound. Allergic/Immunologic: Negative for immunocompromised state. Neurological:  Negative for dizziness, weakness, light-headedness and numbness. Hematological:  Does not bruise/bleed easily. Psychiatric/Behavioral:  Negative for agitation, behavioral problems and confusion. PAST MEDICAL HISTORY     Past Medical History:   Diagnosis Date    Arthritis     rhematoid arthritis    Myelodysplastic syndrome ([facility] [address]        SURGICALHISTORY      has no past surgical history on file. CURRENT MEDICATIONS       Discharge Medication List as of 2022 11:34 AM        CONTINUE these medications which have NOT CHANGED    Details   atorvastatin (LIPITOR) 40 MG tablet Take 1 tablet by mouth nightly, Disp-30 tablet, R-3Normal      metoprolol tartrate (LOPRESSOR) 25 MG tablet Take 1 tablet by mouth 2 times daily, Disp-60 tablet, R-3Normal      nitroGLYCERIN (NITROSTAT) 0.4 MG SL tablet up to max of 3 total doses. If no relief after 1 dose, call 911., Disp-25 tablet, R-3Normal      predniSONE (DELTASONE) 20 MG tablet Take 20 mg by mouth dailyHistorical Med      vitamin D (CHOLECALCIFEROL) 1000 UNIT TABS tablet Take 1,000 Units by mouth dailyHistorical Med      pantoprazole (PROTONIX) 40 MG tablet Take 1 tablet by mouth daily, Disp-30 tablet, R-1Print             ALLERGIES     has No Known Allergies. FAMILY HISTORY     has no family status information on file. family history is not on file.     SOCIAL HISTORY       Social History     Socioeconomic History    Marital status:      Spouse name: Not on file    Number of children: Not on file    Years of education: Not on file    Highest education level: Not on file   Occupational History    Not on file   Tobacco Use    Smoking status: Former     Types: Cigarettes     Quit date: 2017     Years since quittin.5    Smokeless tobacco: Not on file   Substance and Sexual Activity    Alcohol use: No    Drug use: No    Sexual activity: Not on file   Other Topics Concern    Not on file   Social History Narrative    Not on file     Social Determinants of Health     Financial Resource Strain: Not on file   Food Insecurity: Not on file   Transportation Needs: Not on file   Physical Activity: Not on file   Stress: Not on file   Social Connections: Not on file   Intimate Partner Violence: Not on file   Housing Stability: Not on file       PHYSICAL EXAM     INITIAL VITALS:  height is 5' 11\" (1.803 m) and weight is 170 lb (77.1 kg). His oral temperature is 97.8 °F (36.6 °C). His blood pressure is 134/75 and his pulse is 82. His respiration is 16 and oxygen saturation is 100%. Physical Exam  Vitals and nursing note reviewed. Constitutional:       Appearance: Normal appearance. He is well-developed. HENT:      Head: Normocephalic. Right Ear: External ear normal.      Left Ear: External ear normal.      Nose: Nose normal.      Mouth/Throat:      Pharynx: Uvula midline. Eyes:      Conjunctiva/sclera: Conjunctivae normal.   Cardiovascular:      Rate and Rhythm: Normal rate and regular rhythm. Heart sounds: Normal heart sounds, S1 normal and S2 normal.   Pulmonary:      Effort: Pulmonary effort is normal. No respiratory distress. Breath sounds: Wheezing present. Chest:      Chest wall: No tenderness. Abdominal:      General: Bowel sounds are normal. There is no distension. Palpations: Abdomen is soft. Tenderness: There is no abdominal tenderness. Musculoskeletal:         General: Normal range of motion. Cervical back: Normal range of motion and neck supple. Right knee: Swelling present. No deformity, effusion, erythema, ecchymosis, bony tenderness or crepitus. Normal range of motion. No tenderness. No LCL laxity, MCL laxity, ACL laxity or PCL laxity. Normal alignment. Right lower leg: No swelling or tenderness. Skin:     General: Skin is warm and dry. Coloration: Skin is not pale. Findings: No erythema or rash. Neurological:      Mental Status: He is alert and oriented to person, place, and time. Psychiatric:         Behavior: Behavior normal.         Thought Content: Thought content normal.         Judgment: Judgment normal.       DIFFERENTIAL DIAGNOSIS:   Knee arthritis, knee sprain, knee strain, low suspicion of septic arthritis    DIAGNOSTIC RESULTS         RADIOLOGY: non-plainfilm images(s) such as CT, Ultrasound and MRI are read by the radiologist.  Plain radiographic images are visualized and preliminarily interpreted by the emergency physician unless otherwise stated below. XR KNEE RIGHT (MIN 4 VIEWS)   Final Result       1. Mild degenerative change. 2. Small erosions in the tibial plateau and lateral femoral condyle. Outpatient MRI scan may be helpful for better evaluation. 3. Small joint effusion. .               **This report has been created using voice recognition software. It may contain minor errors which are inherent in voice recognition technology. **      Final report electronically signed by DR Miracle Alvarez on 11/1/2022 11:15 AM            LABS:   Labs Reviewed - No data to display    EMERGENCY DEPARTMENT COURSE:   Vitals:    Vitals:    11/01/22 1035   BP: 134/75   Pulse: 82   Resp: 16   Temp: 97.8 °F (36.6 °C)   TempSrc: Oral   SpO2: 100%   Weight: 170 lb (77.1 kg)   Height: 5' 11\" (1.803 m)       MDM    Patient was seen and evaluated in the emergency department, patient appeared to be in no acute distress, vital signs reviewed, no significant findings noted. Physical exam completed, some mild edema to the right knee, no significant decreased passive range of motion, no crepitus, no erythema noted over the knee. No calf tenderness. X-rays show mild degenerative changes, small erosion in the tibial plateau and lateral femoral condyle. Discussed my findings and plan of care with the patient he is amenable with discharge. Advised follow-up with orthopedic institute [facility].   Advised to take medications as they are prescribed. Return to the ER with worsening symptoms. He verbalized understanding of plan of care. Medications   diclofenac sodium (VOLTAREN) 1 % gel 4 g (4 g Topical Given 11/1/22 1141)   predniSONE (DELTASONE) tablet 40 mg (40 mg Oral Given 11/1/22 1122)       Patient was seenindependently by myself. The patient's final impression and disposition and plan was determined by myself. CRITICAL CARE:   None    CONSULTS:  None    PROCEDURES:  None    FINAL IMPRESSION     1. Arthritis of knee, right          DISPOSITION/PLAN   Patient discharged    PATIENT REFERREDTO:  Charles Ramirez [address]  [address] [facility] [phone]  Call   For follow up and evaluation      DISCHARGE MEDICATIONS:  Discharge Medication List as of 11/1/2022 11:34 AM        START taking these medications    Details   methylPREDNISolone (MEDROL, MATT,) 4 MG tablet Take by mouth., Disp-1 kit, R-0Normal      diclofenac sodium (VOLTAREN) 1 % GEL Apply 4 g topically 4 times daily, Topical, 4 TIMES DAILY Starting Tue 11/1/2022, Disp-350 g, R-0, Normal      HYDROcodone-acetaminophen (NORCO) 5-325 MG per tablet Take 1 tablet by mouth every 6 hours as needed for Pain for up to 3 days. Intended supply: 3 days. Take lowest dose possible to manage pain, Disp-10 tablet, R-0Normal             (Please note that portions of this note were completed with a voice recognition program.  Efforts were made to edit the dictations but occasionally words are mis-transcribed.)      Provider:  I personally performed the services described in the documentation,reviewed and edited the documentation which was dictated to the scribe in my presence, and it accurately records my words and actions.     Bishnu Conley CNP 11/01/22 12:29 PM    Fuad Conley, APRN - CNP         Royal Yatri Holidays, APRN - CNP  11/01/22 3205

## 2023-03-22 ENCOUNTER — OFFICE VISIT (OUTPATIENT)
Dept: RHEUMATOLOGY | Age: 63
End: 2023-03-22
Payer: MEDICARE

## 2023-03-22 VITALS
SYSTOLIC BLOOD PRESSURE: 126 MMHG | HEIGHT: 71 IN | DIASTOLIC BLOOD PRESSURE: 62 MMHG | HEART RATE: 88 BPM | BODY MASS INDEX: 23.8 KG/M2 | OXYGEN SATURATION: 99 % | WEIGHT: 170 LBS

## 2023-03-22 DIAGNOSIS — Z87.39 H/O RHEUMATOID ARTHRITIS: ICD-10-CM

## 2023-03-22 DIAGNOSIS — M25.50 MULTIPLE JOINT PAIN: Primary | ICD-10-CM

## 2023-03-22 PROCEDURE — G8420 CALC BMI NORM PARAMETERS: HCPCS | Performed by: INTERNAL MEDICINE

## 2023-03-22 PROCEDURE — 1036F TOBACCO NON-USER: CPT | Performed by: INTERNAL MEDICINE

## 2023-03-22 PROCEDURE — 3017F COLORECTAL CA SCREEN DOC REV: CPT | Performed by: INTERNAL MEDICINE

## 2023-03-22 PROCEDURE — G8428 CUR MEDS NOT DOCUMENT: HCPCS | Performed by: INTERNAL MEDICINE

## 2023-03-22 PROCEDURE — 99204 OFFICE O/P NEW MOD 45 MIN: CPT | Performed by: INTERNAL MEDICINE

## 2023-03-22 PROCEDURE — G8484 FLU IMMUNIZE NO ADMIN: HCPCS | Performed by: INTERNAL MEDICINE

## 2023-03-22 RX ORDER — SERTRALINE HYDROCHLORIDE 100 MG/1
100 TABLET, FILM COATED ORAL DAILY
COMMUNITY
Start: 2023-03-07

## 2023-03-22 RX ORDER — AMOXICILLIN AND CLAVULANATE POTASSIUM 875; 125 MG/1; MG/1
TABLET, FILM COATED ORAL
COMMUNITY
Start: 2023-03-19

## 2023-03-22 ASSESSMENT — JOINT PAIN
TOTAL NUMBER OF TENDER JOINTS: 2
TOTAL NUMBER OF SWOLLEN JOINTS: 1

## 2023-03-22 ASSESSMENT — ENCOUNTER SYMPTOMS
BLOOD IN STOOL: 0
VOMITING: 0
COUGH: 0
ABDOMINAL PAIN: 0
SHORTNESS OF BREATH: 0
NAUSEA: 0

## 2023-03-22 NOTE — PROGRESS NOTES
daily, Disp: 30 tablet, Rfl: 1    amoxicillin-clavulanate (AUGMENTIN) 875-125 MG per tablet, take 1 tablet by mouth twice a day for 10 days, Disp: , Rfl:     sertraline (ZOLOFT) 100 MG tablet, Take 100 mg by mouth daily, Disp: , Rfl:     Allergies:    Patient has no known allergies. Social History:     reports that he quit smoking about 5 years ago. He does not have any smokeless tobacco history on file. He reports that he does not drink alcohol and does not use drugs. Family History:   family history is not on file. REVIEW OF SYSTEMS:    Review of Systems   Constitutional:  Negative for chills, fever and unexpected weight change. HENT:  Negative for mouth sores. Respiratory:  Negative for cough and shortness of breath. Cardiovascular:  Negative for chest pain. Gastrointestinal:  Negative for abdominal pain, blood in stool, nausea and vomiting. Skin:  Negative for rash. Neurological:  Negative for headaches. PHYSICAL EXAM:    Vitals:    /62 (Site: Left Upper Arm, Position: Sitting, Cuff Size: Large Adult)   Pulse 88   Ht 5' 11\" (1.803 m)   Wt 170 lb (77.1 kg)   SpO2 99%   BMI 23.71 kg/m²     Physical Exam  Constitutional:       General: He is not in acute distress. Appearance: Normal appearance. HENT:      Mouth/Throat:      Mouth: Mucous membranes are moist.      Pharynx: Oropharynx is clear. Eyes:      Conjunctiva/sclera: Conjunctivae normal.   Cardiovascular:      Rate and Rhythm: Normal rate and regular rhythm. Heart sounds: Normal heart sounds. No murmur heard. No friction rub. Pulmonary:      Effort: Pulmonary effort is normal. No respiratory distress. Breath sounds: Normal breath sounds. No wheezing or rhonchi. Musculoskeletal:         General: Swelling present. No tenderness or deformity. Normal range of motion. Cervical back: Neck supple. Right lower leg: No edema. Left lower leg: No edema.    Lymphadenopathy:      Cervical:

## 2023-03-25 LAB
ALBUMIN SERPL-MCNC: 4.1 G/DL
ALP BLD-CCNC: 89 U/L
ALT SERPL-CCNC: 8 U/L
ANION GAP SERPL CALCULATED.3IONS-SCNC: 13 MMOL/L
AST SERPL-CCNC: 19 U/L
BASOPHILS ABSOLUTE: 0.06 /ΜL
BASOPHILS RELATIVE PERCENT: 0.7 %
BILIRUB SERPL-MCNC: 0.2 MG/DL (ref 0.1–1.4)
BUN BLDV-MCNC: 16 MG/DL
C-REACTIVE PROTEIN: 6.8
CALCIUM SERPL-MCNC: 8.8 MG/DL
CHLORIDE BLD-SCNC: 98 MMOL/L
CO2: 23 MMOL/L
CREAT SERPL-MCNC: 0.74 MG/DL
EGFR: 101
EOSINOPHILS ABSOLUTE: 0.11 /ΜL
EOSINOPHILS RELATIVE PERCENT: 1.2 %
GLUCOSE BLD-MCNC: 139 MG/DL
HCT VFR BLD CALC: 35 % (ref 41–53)
HEMOGLOBIN: 11.8 G/DL (ref 13.5–17.5)
LYMPHOCYTES ABSOLUTE: 1.93 /ΜL
LYMPHOCYTES RELATIVE PERCENT: 21.5 %
MCH RBC QN AUTO: 28.6 PG
MCHC RBC AUTO-ENTMCNC: 33.7 G/DL
MCV RBC AUTO: 85 FL
MONOCYTES ABSOLUTE: 0.95 /ΜL
MONOCYTES RELATIVE PERCENT: 10.6 %
NEUTROPHILS ABSOLUTE: 5.85 /ΜL
NEUTROPHILS RELATIVE PERCENT: 65.3 %
PDW BLD-RTO: 13 %
PLATELET # BLD: 269 K/ΜL
PMV BLD AUTO: 10.2 FL
POTASSIUM SERPL-SCNC: 4.3 MMOL/L
RBC # BLD: 4.12 10^6/ΜL
RHEUMATOID FACTOR: 39
SEDIMENTATION RATE, ERYTHROCYTE: 54
SODIUM BLD-SCNC: 134 MMOL/L
TOTAL PROTEIN: 6.9
URIC ACID: 4.6
WBC # BLD: 9 10^3/ML

## 2023-05-26 ENCOUNTER — TRANSCRIBE ORDERS (OUTPATIENT)
Dept: ADMINISTRATIVE | Age: 63
End: 2023-05-26

## 2023-05-26 DIAGNOSIS — M85.89 OTHER SPECIFIED DISORDERS OF BONE DENSITY AND STRUCTURE, MULTIPLE SITES: Primary | ICD-10-CM

## 2023-09-13 ENCOUNTER — OFFICE VISIT (OUTPATIENT)
Dept: RHEUMATOLOGY | Age: 63
End: 2023-09-13
Payer: MEDICARE

## 2023-09-13 VITALS
BODY MASS INDEX: 24.5 KG/M2 | DIASTOLIC BLOOD PRESSURE: 74 MMHG | HEIGHT: 71 IN | WEIGHT: 175 LBS | OXYGEN SATURATION: 98 % | SYSTOLIC BLOOD PRESSURE: 116 MMHG | HEART RATE: 56 BPM

## 2023-09-13 DIAGNOSIS — M05.9 SEROPOSITIVE RHEUMATOID ARTHRITIS (HCC): Primary | ICD-10-CM

## 2023-09-13 PROCEDURE — 99214 OFFICE O/P EST MOD 30 MIN: CPT | Performed by: INTERNAL MEDICINE

## 2023-09-13 PROCEDURE — G8420 CALC BMI NORM PARAMETERS: HCPCS | Performed by: INTERNAL MEDICINE

## 2023-09-13 PROCEDURE — 3017F COLORECTAL CA SCREEN DOC REV: CPT | Performed by: INTERNAL MEDICINE

## 2023-09-13 PROCEDURE — 1036F TOBACCO NON-USER: CPT | Performed by: INTERNAL MEDICINE

## 2023-09-13 PROCEDURE — 1123F ACP DISCUSS/DSCN MKR DOCD: CPT | Performed by: INTERNAL MEDICINE

## 2023-09-13 PROCEDURE — G8427 DOCREV CUR MEDS BY ELIG CLIN: HCPCS | Performed by: INTERNAL MEDICINE

## 2023-09-13 ASSESSMENT — JOINT PAIN
TOTAL NUMBER OF TENDER JOINTS: 0
TOTAL NUMBER OF SWOLLEN JOINTS: 0

## 2023-09-13 ASSESSMENT — ENCOUNTER SYMPTOMS
VOMITING: 0
COUGH: 0
ABDOMINAL PAIN: 0
NAUSEA: 0
SHORTNESS OF BREATH: 0

## 2024-10-30 ENCOUNTER — OFFICE VISIT (OUTPATIENT)
Dept: RHEUMATOLOGY | Age: 64
End: 2024-10-30
Payer: MEDICARE

## 2024-10-30 VITALS
DIASTOLIC BLOOD PRESSURE: 68 MMHG | SYSTOLIC BLOOD PRESSURE: 116 MMHG | OXYGEN SATURATION: 98 % | HEIGHT: 71 IN | BODY MASS INDEX: 26.46 KG/M2 | HEART RATE: 94 BPM | WEIGHT: 189 LBS

## 2024-10-30 DIAGNOSIS — M05.9 SEROPOSITIVE RHEUMATOID ARTHRITIS (HCC): Primary | ICD-10-CM

## 2024-10-30 PROCEDURE — 1123F ACP DISCUSS/DSCN MKR DOCD: CPT | Performed by: INTERNAL MEDICINE

## 2024-10-30 PROCEDURE — G8484 FLU IMMUNIZE NO ADMIN: HCPCS | Performed by: INTERNAL MEDICINE

## 2024-10-30 PROCEDURE — 99214 OFFICE O/P EST MOD 30 MIN: CPT | Performed by: INTERNAL MEDICINE

## 2024-10-30 PROCEDURE — G8427 DOCREV CUR MEDS BY ELIG CLIN: HCPCS | Performed by: INTERNAL MEDICINE

## 2024-10-30 PROCEDURE — 1036F TOBACCO NON-USER: CPT | Performed by: INTERNAL MEDICINE

## 2024-10-30 PROCEDURE — G8419 CALC BMI OUT NRM PARAM NOF/U: HCPCS | Performed by: INTERNAL MEDICINE

## 2024-10-30 PROCEDURE — 3017F COLORECTAL CA SCREEN DOC REV: CPT | Performed by: INTERNAL MEDICINE

## 2024-10-30 PROCEDURE — 1126F AMNT PAIN NOTED NONE PRSNT: CPT | Performed by: INTERNAL MEDICINE

## 2024-10-30 PROCEDURE — 1159F MED LIST DOCD IN RCRD: CPT | Performed by: INTERNAL MEDICINE

## 2024-10-30 ASSESSMENT — ENCOUNTER SYMPTOMS
ABDOMINAL PAIN: 0
VOMITING: 0
NAUSEA: 0
SHORTNESS OF BREATH: 0
COUGH: 0

## 2024-10-30 NOTE — PROGRESS NOTES
[facility]   Rheumatology Clinic Note      10/30/2024         CHIEF COMPLAINT:    Chief Complaint   Patient presents with    1 Year Follow Up     Seropositive rheumatoid arthritis (HCC)    Other     Pt is tolerating well, but has some occasional flares            HISTORY OF PRESENT ILLNESS:    64 y.o. male with seropositive rheumatoid arthritis (+RF, +CCP) presents for follow up. He admits that he has not been taking hydroxychloroquine for over 6 months.    Reports that he is doing well. Has episodes of mild tolerable joint pain that improves with Aleve. Occurs randomly, on average once a month or less.     At this time, has no joint pain, joint swelling or prolonged morning stiffness.      Initial Consultation  3/22/2023:  Was diagnosed with RA many years ago. Former pt of Dr. Cruz.   Has not been on meds for at least 5 years.    Reports having joint pain sporadically. Few weeks ago, he had a flare up in the right knee that lasts for several days. Pain was severe that he had to use crutches. Once it resolved, pain and swelling started in his left wrist.  Pain and swelling on and off. Usually occurs 2-3 times per month. Duration of flare up lasts anywehre from 2-3 days to 2-3 weeks.  Hot showers and heat eases his pain. Unaware of any triggers.    No prolonged morning stiffness.  No skin rash, no psoriasis, no oral ulcers.    PMH: h/o myelodysplastic syndrome, underwent stem cell transplant 2017.      Past Medical History:     has a past medical history of Arthritis and Myelodysplastic syndrome (HCC).    Past Surgical History:     has no past surgical history on file.    Current Medications:      Current Outpatient Medications:     sertraline (ZOLOFT) 100 MG tablet, Take 1 tablet by mouth daily (Patient not taking: Reported on 10/30/2024), Disp: , Rfl:     Allergies:    Patient has no known allergies.    Social History:     reports that he quit smoking about 7 years ago. His smoking use included